# Patient Record
Sex: FEMALE | Race: OTHER | HISPANIC OR LATINO
[De-identification: names, ages, dates, MRNs, and addresses within clinical notes are randomized per-mention and may not be internally consistent; named-entity substitution may affect disease eponyms.]

---

## 2019-06-25 ENCOUNTER — APPOINTMENT (OUTPATIENT)
Dept: GASTROENTEROLOGY | Facility: CLINIC | Age: 44
End: 2019-06-25
Payer: MEDICAID

## 2019-06-25 VITALS
SYSTOLIC BLOOD PRESSURE: 140 MMHG | HEIGHT: 63 IN | TEMPERATURE: 98.5 F | WEIGHT: 161 LBS | BODY MASS INDEX: 28.53 KG/M2 | OXYGEN SATURATION: 100 % | HEART RATE: 81 BPM | DIASTOLIC BLOOD PRESSURE: 93 MMHG

## 2019-06-25 DIAGNOSIS — K30 FUNCTIONAL DYSPEPSIA: ICD-10-CM

## 2019-06-25 DIAGNOSIS — K64.9 UNSPECIFIED HEMORRHOIDS: ICD-10-CM

## 2019-06-25 DIAGNOSIS — I10 ESSENTIAL (PRIMARY) HYPERTENSION: ICD-10-CM

## 2019-06-25 DIAGNOSIS — K29.70 GASTRITIS, UNSPECIFIED, W/OUT BLEEDING: ICD-10-CM

## 2019-06-25 PROCEDURE — 99204 OFFICE O/P NEW MOD 45 MIN: CPT

## 2019-06-25 RX ORDER — PANTOPRAZOLE 40 MG/1
40 TABLET, DELAYED RELEASE ORAL DAILY
Qty: 30 | Refills: 2 | Status: ACTIVE | COMMUNITY
Start: 2019-06-25 | End: 1900-01-01

## 2019-06-29 PROBLEM — I10 HYPERTENSION, UNSPECIFIED TYPE: Status: ACTIVE | Noted: 2019-06-29

## 2019-06-29 RX ORDER — LOSARTAN POTASSIUM AND HYDROCHLOROTHIAZIDE 25; 100 MG/1; MG/1
100-25 TABLET ORAL
Refills: 0 | Status: ACTIVE | COMMUNITY
Start: 2019-06-29

## 2019-06-29 NOTE — REVIEW OF SYSTEMS
[Joint Pain] : joint pain [Eye Pain] : eye pain [Negative] : Heme/Lymph [Fever] : no fever [Chills] : no chills [Lower Ext Edema] : no lower extremity edema [Chest Pain] : no chest pain [Shortness Of Breath] : no shortness of breath [Wheezing] : no wheezing [Cough] : no cough [Abdominal Pain] : no abdominal pain [Diarrhea] : no diarrhea [Heartburn] : no heartburn [FreeTextEntry5] : HTN [FreeTextEntry7] : bloating , gas and indigestion and hemorrhoids  [FreeTextEntry9] : and back pain

## 2019-06-29 NOTE — PHYSICAL EXAM
[General Appearance - Alert] : alert [General Appearance - Well Nourished] : well nourished [General Appearance - Well-Appearing] : healthy appearing [General Appearance - Well Developed] : well developed [Sclera] : the sclera and conjunctiva were normal [Outer Ear] : the ears and nose were normal in appearance [Respiration, Rhythm And Depth] : normal respiratory rhythm and effort [Auscultation Breath Sounds / Voice Sounds] : lungs were clear to auscultation bilaterally [] : no respiratory distress [Heart Rate And Rhythm] : heart rate was normal and rhythm regular [Heart Sounds] : normal S1 and S2 [Edema] : there was no peripheral edema [Bowel Sounds] : normal bowel sounds [Abdomen Soft] : soft [Abnormal Walk] : normal gait [Oriented To Time, Place, And Person] : oriented to person, place, and time [Affect] : the affect was normal [Impaired Insight] : insight and judgment were intact [Cervical Lymph Nodes Enlarged Anterior Bilaterally] : anterior cervical [Supraclavicular Lymph Nodes Enlarged Bilaterally] : supraclavicular [No CVA Tenderness] : no ~M costovertebral angle tenderness [FreeTextEntry1] : No confusion

## 2019-06-29 NOTE — ASSESSMENT
[FreeTextEntry1] : Impression:\par \par #1.  Abdominal bloating\par #2.  Chronic dyspepsia x 3 years\par #3. Gastritis and hiatal hernia on EGD March 2018 (Dr. Lrary), with some improvement in symptoms on PPI.\par #4. Colonoscopy in March 2018 with poor prep / solid stool of cecum and hemorrhoids.\par \par Plan:\par \par #1.  Labs as below\par #2.  Ct scan abd/pelvis with contrast to look for inflammation, neoplasm.\par #3.  Gas-X PRN bloating\par #4.  Protonix daily\par #5.  Topical hemorrhoidal creme (prep H) PRN hemorrhoidal irritation.\par #6.  Colonoscopy.  Risks, benefits, alternatives discussed, patient understands and agrees to proceed.\par #7.  Golytely ordered.

## 2019-06-29 NOTE — REASON FOR VISIT
[Friend] : friend [Initial Evaluation] : an initial evaluation [FreeTextEntry1] : abdominal discomfort, bloating , diarrhea and hemorrhoidal discomfort

## 2019-06-29 NOTE — HISTORY OF PRESENT ILLNESS
[FreeTextEntry1] : Via [place]   # [id number]\par Accompanied by friend\par \par Patient is a 43 y.o Grenadian speaking  female who presents today with c/o abdominal discomfort/ bloating after meals, she reports the  symptoms are more frequent when she eats meals that contain meat. Moderate epigastric/periumbilical pain, sharp, lasting up to 30 minutes.  Denies nausea or vomiting, her weight is stable . She reports abdominal bloating and loose non bloody stools soon after eating a meal.  She gets some relief of symptoms after moving her bowels . She reports she has had these symptoms for over 3 years. \par Rare rectal irritation.\par No melena, hematochezia, hematemesis.\par No fever, chills, sweats, heartburn, dysphagia, jaundice.\par \par When in [place] she said she was diagnosed with gastritis and was on a PPI omeprazole that offered some relief. She has upper endoscopy dated 3/6/18 by Dr Rui Larry, [facility] noted for small hiatal hernia and gastritis , She had colonoscopy also 3/6/18 noted for Internal hemorrhoids, and the quality of the prep was reported as adequate, though the pictures that accompany the report there is visible stool. the cecum reported with feces

## 2019-07-01 LAB
ALBUMIN SERPL ELPH-MCNC: 5.1 G/DL
ALP BLD-CCNC: 59 U/L
ALT SERPL-CCNC: 65 U/L
AMYLASE/CREAT SERPL: 64 U/L
ANION GAP SERPL CALC-SCNC: 16 MMOL/L
AST SERPL-CCNC: 40 U/L
BASOPHILS # BLD AUTO: 0.02 K/UL
BASOPHILS NFR BLD AUTO: 0.5 %
BILIRUB SERPL-MCNC: 0.3 MG/DL
BUN SERPL-MCNC: 12 MG/DL
CALCIUM SERPL-MCNC: 10.3 MG/DL
CHLORIDE SERPL-SCNC: 97 MMOL/L
CO2 SERPL-SCNC: 26 MMOL/L
CREAT SERPL-MCNC: 0.59 MG/DL
EOSINOPHIL # BLD AUTO: 0.08 K/UL
EOSINOPHIL NFR BLD AUTO: 1.9 %
GLUCOSE SERPL-MCNC: 162 MG/DL
HCT VFR BLD CALC: 39.2 %
HGB BLD-MCNC: 12.5 G/DL
IMM GRANULOCYTES NFR BLD AUTO: 0.2 %
LPL SERPL-CCNC: 59 U/L
LYMPHOCYTES # BLD AUTO: 1.36 K/UL
LYMPHOCYTES NFR BLD AUTO: 32.6 %
MAN DIFF?: NORMAL
MCHC RBC-ENTMCNC: 27 PG
MCHC RBC-ENTMCNC: 31.9 GM/DL
MCV RBC AUTO: 84.7 FL
MONOCYTES # BLD AUTO: 0.33 K/UL
MONOCYTES NFR BLD AUTO: 7.9 %
NEUTROPHILS # BLD AUTO: 2.37 K/UL
NEUTROPHILS NFR BLD AUTO: 56.9 %
PLATELET # BLD AUTO: 206 K/UL
POTASSIUM SERPL-SCNC: 4.2 MMOL/L
PROT SERPL-MCNC: 7.6 G/DL
RBC # BLD: 4.63 M/UL
RBC # FLD: 12 %
SODIUM SERPL-SCNC: 139 MMOL/L
TSH SERPL-ACNC: 1.36 UIU/ML
TTG IGA SER IA-ACNC: <1.2 U/ML
TTG IGA SER-ACNC: NEGATIVE
WBC # FLD AUTO: 4.17 K/UL

## 2019-07-09 ENCOUNTER — OUTPATIENT (OUTPATIENT)
Dept: OUTPATIENT SERVICES | Facility: HOSPITAL | Age: 44
LOS: 1 days | End: 2019-07-09
Payer: MEDICAID

## 2019-07-09 ENCOUNTER — APPOINTMENT (OUTPATIENT)
Dept: ULTRASOUND IMAGING | Facility: CLINIC | Age: 44
End: 2019-07-09
Payer: MEDICAID

## 2019-07-09 DIAGNOSIS — Z00.8 ENCOUNTER FOR OTHER GENERAL EXAMINATION: ICD-10-CM

## 2019-07-09 DIAGNOSIS — R94.5 ABNORMAL RESULTS OF LIVER FUNCTION STUDIES: ICD-10-CM

## 2019-07-09 PROCEDURE — 76700 US EXAM ABDOM COMPLETE: CPT

## 2019-07-09 PROCEDURE — 76700 US EXAM ABDOM COMPLETE: CPT | Mod: 26

## 2019-07-23 ENCOUNTER — TRANSCRIPTION ENCOUNTER (OUTPATIENT)
Age: 44
End: 2019-07-23

## 2019-07-26 ENCOUNTER — APPOINTMENT (OUTPATIENT)
Dept: GASTROENTEROLOGY | Facility: HOSPITAL | Age: 44
End: 2019-07-26

## 2019-07-26 ENCOUNTER — OUTPATIENT (OUTPATIENT)
Dept: OUTPATIENT SERVICES | Facility: HOSPITAL | Age: 44
LOS: 1 days | End: 2019-07-26
Payer: MEDICAID

## 2019-07-26 ENCOUNTER — RESULT REVIEW (OUTPATIENT)
Age: 44
End: 2019-07-26

## 2019-07-26 DIAGNOSIS — K64.9 UNSPECIFIED HEMORRHOIDS: ICD-10-CM

## 2019-07-26 DIAGNOSIS — R19.7 DIARRHEA, UNSPECIFIED: ICD-10-CM

## 2019-07-26 DIAGNOSIS — R14.0 ABDOMINAL DISTENSION (GASEOUS): ICD-10-CM

## 2019-07-26 PROCEDURE — 45385 COLONOSCOPY W/LESION REMOVAL: CPT | Mod: GC

## 2019-07-26 PROCEDURE — 45380 COLONOSCOPY AND BIOPSY: CPT | Mod: XS

## 2019-07-26 PROCEDURE — 45380 COLONOSCOPY AND BIOPSY: CPT | Mod: GC,59

## 2019-07-26 PROCEDURE — 45385 COLONOSCOPY W/LESION REMOVAL: CPT

## 2019-07-26 PROCEDURE — 88305 TISSUE EXAM BY PATHOLOGIST: CPT | Mod: 26

## 2019-07-26 PROCEDURE — 88305 TISSUE EXAM BY PATHOLOGIST: CPT

## 2019-07-29 LAB — SURGICAL PATHOLOGY STUDY: SIGNIFICANT CHANGE UP

## 2019-08-21 ENCOUNTER — APPOINTMENT (OUTPATIENT)
Dept: HEPATOLOGY | Facility: CLINIC | Age: 44
End: 2019-08-21

## 2019-08-22 ENCOUNTER — APPOINTMENT (OUTPATIENT)
Dept: HEPATOLOGY | Facility: CLINIC | Age: 44
End: 2019-08-22
Payer: MEDICAID

## 2019-08-22 ENCOUNTER — LABORATORY RESULT (OUTPATIENT)
Age: 44
End: 2019-08-22

## 2019-08-22 VITALS
DIASTOLIC BLOOD PRESSURE: 79 MMHG | TEMPERATURE: 98.4 F | RESPIRATION RATE: 14 BRPM | HEIGHT: 63 IN | WEIGHT: 159 LBS | BODY MASS INDEX: 28.17 KG/M2 | HEART RATE: 86 BPM | SYSTOLIC BLOOD PRESSURE: 114 MMHG

## 2019-08-22 DIAGNOSIS — Z87.19 PERSONAL HISTORY OF OTHER DISEASES OF THE DIGESTIVE SYSTEM: ICD-10-CM

## 2019-08-22 PROCEDURE — 99203 OFFICE O/P NEW LOW 30 MIN: CPT

## 2019-08-23 LAB
A1AT SERPL-MCNC: 108 MG/DL
ALBUMIN SERPL ELPH-MCNC: 5 G/DL
ALP BLD-CCNC: 65 U/L
ALT SERPL-CCNC: 71 U/L
ANION GAP SERPL CALC-SCNC: 19 MMOL/L
AST SERPL-CCNC: 54 U/L
BASOPHILS # BLD AUTO: 0.03 K/UL
BASOPHILS NFR BLD AUTO: 0.5 %
BILIRUB SERPL-MCNC: 0.3 MG/DL
BUN SERPL-MCNC: 15 MG/DL
CALCIUM SERPL-MCNC: 10.3 MG/DL
CERULOPLASMIN SERPL-MCNC: 21 MG/DL
CHLORIDE SERPL-SCNC: 98 MMOL/L
CO2 SERPL-SCNC: 24 MMOL/L
CREAT SERPL-MCNC: 0.66 MG/DL
DEPRECATED KAPPA LC FREE/LAMBDA SER: 2.38 RATIO
EOSINOPHIL # BLD AUTO: 0.09 K/UL
EOSINOPHIL NFR BLD AUTO: 1.5 %
ESTIMATED AVERAGE GLUCOSE: 160 MG/DL
FERRITIN SERPL-MCNC: 113 NG/ML
GLUCOSE SERPL-MCNC: 112 MG/DL
HBA1C MFR BLD HPLC: 7.2 %
HBV CORE IGG+IGM SER QL: NONREACTIVE
HBV SURFACE AB SER QL: NONREACTIVE
HBV SURFACE AG SER QL: NONREACTIVE
HCT VFR BLD CALC: 40.7 %
HCV AB SER QL: NONREACTIVE
HCV S/CO RATIO: 0.06 S/CO
HGB BLD-MCNC: 12.4 G/DL
IGA SER QL IEP: 128 MG/DL
IGG SER QL IEP: 1024 MG/DL
IGM SER QL IEP: 47 MG/DL
IMM GRANULOCYTES NFR BLD AUTO: 0.2 %
IRON SATN MFR SERPL: 26 %
IRON SERPL-MCNC: 103 UG/DL
KAPPA LC CSF-MCNC: 0.69 MG/DL
KAPPA LC SERPL-MCNC: 1.64 MG/DL
LYMPHOCYTES # BLD AUTO: 1.83 K/UL
LYMPHOCYTES NFR BLD AUTO: 30 %
MAN DIFF?: NORMAL
MCHC RBC-ENTMCNC: 26.3 PG
MCHC RBC-ENTMCNC: 30.5 GM/DL
MCV RBC AUTO: 86.2 FL
MITOCHONDRIA AB SER IF-ACNC: NORMAL
MONOCYTES # BLD AUTO: 0.38 K/UL
MONOCYTES NFR BLD AUTO: 6.2 %
MPO AB + PR3 PNL SER: NORMAL
NEUTROPHILS # BLD AUTO: 3.76 K/UL
NEUTROPHILS NFR BLD AUTO: 61.6 %
PLATELET # BLD AUTO: 213 K/UL
POTASSIUM SERPL-SCNC: 3.7 MMOL/L
PROT SERPL-MCNC: 7.6 G/DL
RBC # BLD: 4.72 M/UL
RBC # FLD: 12.7 %
SMOOTH MUSCLE AB SER QL IF: NORMAL
SODIUM SERPL-SCNC: 140 MMOL/L
TIBC SERPL-MCNC: 404 UG/DL
UIBC SERPL-MCNC: 301 UG/DL
WBC # FLD AUTO: 6.1 K/UL

## 2019-08-26 LAB
ANA SER IF-ACNC: NEGATIVE
CARDIOLIPIN AB SER IA-ACNC: NEGATIVE
LKM AB SER QL IF: <20.1 UNITS
TTG IGA SER IA-ACNC: <1.2 U/ML
TTG IGA SER-ACNC: NEGATIVE

## 2019-08-30 LAB — SOLUBLE LIVER IGG SER IA-ACNC: < 20.1 UNITS

## 2019-09-20 ENCOUNTER — FORM ENCOUNTER (OUTPATIENT)
Age: 44
End: 2019-09-20

## 2019-09-21 ENCOUNTER — OUTPATIENT (OUTPATIENT)
Dept: OUTPATIENT SERVICES | Facility: HOSPITAL | Age: 44
LOS: 1 days | End: 2019-09-21
Payer: MEDICAID

## 2019-09-21 ENCOUNTER — APPOINTMENT (OUTPATIENT)
Dept: MRI IMAGING | Facility: IMAGING CENTER | Age: 44
End: 2019-09-21
Payer: MEDICAID

## 2019-09-21 DIAGNOSIS — K76.0 FATTY (CHANGE OF) LIVER, NOT ELSEWHERE CLASSIFIED: ICD-10-CM

## 2019-09-21 DIAGNOSIS — R94.5 ABNORMAL RESULTS OF LIVER FUNCTION STUDIES: ICD-10-CM

## 2019-09-21 PROCEDURE — 76391 MR ELASTOGRAPHY: CPT | Mod: 26

## 2019-09-21 PROCEDURE — 76391 MR ELASTOGRAPHY: CPT

## 2019-09-21 PROCEDURE — 74181 MRI ABDOMEN W/O CONTRAST: CPT | Mod: 26

## 2019-09-21 PROCEDURE — 74181 MRI ABDOMEN W/O CONTRAST: CPT

## 2019-09-26 ENCOUNTER — APPOINTMENT (OUTPATIENT)
Dept: GASTROENTEROLOGY | Facility: CLINIC | Age: 44
End: 2019-09-26
Payer: MEDICAID

## 2019-09-26 VITALS
HEART RATE: 88 BPM | OXYGEN SATURATION: 99 % | BODY MASS INDEX: 28 KG/M2 | RESPIRATION RATE: 14 BRPM | SYSTOLIC BLOOD PRESSURE: 112 MMHG | HEIGHT: 63 IN | WEIGHT: 158 LBS | DIASTOLIC BLOOD PRESSURE: 68 MMHG

## 2019-09-26 DIAGNOSIS — R12 HEARTBURN: ICD-10-CM

## 2019-09-26 DIAGNOSIS — R14.0 ABDOMINAL DISTENSION (GASEOUS): ICD-10-CM

## 2019-09-26 PROCEDURE — 99214 OFFICE O/P EST MOD 30 MIN: CPT

## 2019-10-07 PROBLEM — R12 HEARTBURN: Status: ACTIVE | Noted: 2019-10-07

## 2019-10-07 PROBLEM — R14.0 ABDOMINAL BLOATING: Status: ACTIVE | Noted: 2019-06-25

## 2019-10-07 NOTE — HISTORY OF PRESENT ILLNESS
[FreeTextEntry1] : Accompanied by family member, who translates Congolese per patient request.\par \par Still has mild-moderate right sided abd pain\par Associated with bloating/abd distention\par PPI helps heartburn but not pain.\par No melena, hematochezia, hematemesis.\par No fever, chills, sweats, dysphagia, jaundice.\par \par Labs: 6/25/19\par CBC essentially normal\par CMP notable for mild transaminitis (ALT 65)\par Amylase/lipase/transglutaminase IgA/TSH  normal.\par \par Imaging:\par \par She did not schedule CT\par \par Ultrasound 7/9/19:  Fatty liver with mild hepatomegaly, normal bile ducts, normal gallbladder.\par \par Had MRI abd 9/21/19: (WITHOUT CONTRAST)\par Severe hepatic steatosis\par Few scattered subcentimeter T2 bright foci, not fully characterized.\par \par Colonoscopy:complete to cecum with poor prep\par Removed 3 mm tubular adenoma with sigmoid\par Random biopsies without microscopic or collagenous colitis.

## 2019-10-07 NOTE — PHYSICAL EXAM
[General Appearance - Alert] : alert [General Appearance - In No Acute Distress] : in no acute distress [Oropharynx] : the oropharynx was normal [Sclera] : the sclera and conjunctiva were normal [Bowel Sounds] : normal bowel sounds [Abdomen Soft] : soft [] : no hepato-splenomegaly [Abdomen Mass (___ Cm)] : no abdominal mass palpated [RUQ] : in the right upper quadrant [Abnormal Walk] : normal gait [Affect] : the affect was normal [FreeTextEntry1] : No jaundice

## 2019-10-07 NOTE — CONSULT LETTER
[Dear  ___] : Dear  [unfilled], [Please see my note below.] : Please see my note below. [Consult Letter:] : I had the pleasure of evaluating your patient, [unfilled]. [Consult Closing:] : Thank you very much for allowing me to participate in the care of this patient.  If you have any questions, please do not hesitate to contact me. [FreeTextEntry3] : Didier Dumont MD, MPH, FASGE\par Chief of Clinical Quality in Gastroenterology, [facility]\par Director of Endoscopic Ultrasound, [facility]\par [address], [address]\par [place], [address]\par Weekdays 8 AM-4PM [phone] - [place]\par 24 hours [phone] [Sincerely,] : Sincerely,

## 2019-10-07 NOTE — ASSESSMENT
[FreeTextEntry1] : Impression:\par \par #1. RUQ abd pain/tenderness with abdominal bloating\par #2. Chronic dyspepsia x 3 years, no etiology on labs/ultrasound/MRI/EGD/colonoscopy (poor prep in cecum)\par #3. Gastritis and hiatal hernia on EGD March 2018 (Dr. Larry), with some improvement in symptoms (primarilly heartburn) on PPI.\par #4. Colonoscopy in March 2018 with poor prep / solid stool of cecum and hemorrhoids.  Repeat colonoscopy July 2019 with poor prep in cecum only.  Small 3 mm tubular adenoma removed from sigmoid colon..\par #5.  Fatty liver on ultrasound/MRI with minimal elevation in ALT.\par \par Plan:\par \par #1.  CT abd/pelvis with contrast to look for inflammation, neoplasm.\par #2.  Gastric emptying study\par #3.  Colonoscopy with modified prep\par \par Risks, benefits, alternatives of the procedure were discussed with the patient and the patient was educated about the procedure. Risks include, but are not limited to, bleeding, infection, injury to internal organs, possible need for further procedures including emergency surgery, missed lesions, risk of anesthesia, and risk of IV site problems.  Patient understands and agrees to proceed.\par \par #4.  GI office followup 3 month\par #5.  Renew PPI.\par #6.  Gas-X PRN bloating\par #7. Topical hemorrhoidal creme (prep H) PRN hemorrhoidal irritation.\par

## 2019-10-09 ENCOUNTER — FORM ENCOUNTER (OUTPATIENT)
Age: 44
End: 2019-10-09

## 2019-10-10 ENCOUNTER — OUTPATIENT (OUTPATIENT)
Dept: OUTPATIENT SERVICES | Facility: HOSPITAL | Age: 44
LOS: 1 days | End: 2019-10-10

## 2019-10-10 ENCOUNTER — APPOINTMENT (OUTPATIENT)
Dept: NUCLEAR MEDICINE | Facility: HOSPITAL | Age: 44
End: 2019-10-10
Payer: MEDICAID

## 2019-10-10 DIAGNOSIS — R14.0 ABDOMINAL DISTENSION (GASEOUS): ICD-10-CM

## 2019-10-10 PROCEDURE — 78264 GASTRIC EMPTYING IMG STUDY: CPT | Mod: 26

## 2019-10-16 ENCOUNTER — RX RENEWAL (OUTPATIENT)
Age: 44
End: 2019-10-16

## 2019-10-18 ENCOUNTER — RESULT REVIEW (OUTPATIENT)
Age: 44
End: 2019-10-18

## 2019-10-18 ENCOUNTER — OUTPATIENT (OUTPATIENT)
Dept: OUTPATIENT SERVICES | Facility: HOSPITAL | Age: 44
LOS: 1 days | End: 2019-10-18
Payer: MEDICAID

## 2019-10-18 ENCOUNTER — APPOINTMENT (OUTPATIENT)
Dept: GASTROENTEROLOGY | Facility: HOSPITAL | Age: 44
End: 2019-10-18

## 2019-10-18 DIAGNOSIS — R10.9 UNSPECIFIED ABDOMINAL PAIN: ICD-10-CM

## 2019-10-18 PROCEDURE — 88305 TISSUE EXAM BY PATHOLOGIST: CPT | Mod: 26

## 2019-10-18 PROCEDURE — 45380 COLONOSCOPY AND BIOPSY: CPT | Mod: GC,33

## 2019-10-18 PROCEDURE — 88305 TISSUE EXAM BY PATHOLOGIST: CPT

## 2019-10-18 PROCEDURE — 45380 COLONOSCOPY AND BIOPSY: CPT | Mod: PT

## 2019-10-21 LAB — SURGICAL PATHOLOGY STUDY: SIGNIFICANT CHANGE UP

## 2019-11-22 ENCOUNTER — APPOINTMENT (OUTPATIENT)
Dept: HEPATOLOGY | Facility: CLINIC | Age: 44
End: 2019-11-22
Payer: MEDICAID

## 2019-11-22 VITALS
SYSTOLIC BLOOD PRESSURE: 122 MMHG | HEIGHT: 63 IN | RESPIRATION RATE: 14 BRPM | HEART RATE: 88 BPM | DIASTOLIC BLOOD PRESSURE: 82 MMHG | BODY MASS INDEX: 27.46 KG/M2 | WEIGHT: 155 LBS | TEMPERATURE: 98.3 F

## 2019-11-22 PROCEDURE — 99213 OFFICE O/P EST LOW 20 MIN: CPT

## 2019-11-24 LAB
ALBUMIN SERPL ELPH-MCNC: 5 G/DL
ALP BLD-CCNC: 67 U/L
ALT SERPL-CCNC: 79 U/L
AST SERPL-CCNC: 53 U/L
BILIRUB DIRECT SERPL-MCNC: 0.1 MG/DL
BILIRUB INDIRECT SERPL-MCNC: 0.3 MG/DL
BILIRUB SERPL-MCNC: 0.4 MG/DL
PROT SERPL-MCNC: 7.5 G/DL

## 2019-11-27 LAB — HEPATITIS A IGG ANTIBODY: REACTIVE

## 2019-12-06 ENCOUNTER — APPOINTMENT (OUTPATIENT)
Dept: CT IMAGING | Facility: CLINIC | Age: 44
End: 2019-12-06
Payer: MEDICAID

## 2019-12-06 ENCOUNTER — OUTPATIENT (OUTPATIENT)
Dept: OUTPATIENT SERVICES | Facility: HOSPITAL | Age: 44
LOS: 1 days | End: 2019-12-06
Payer: MEDICAID

## 2019-12-06 DIAGNOSIS — R10.9 UNSPECIFIED ABDOMINAL PAIN: ICD-10-CM

## 2019-12-06 PROCEDURE — 74177 CT ABD & PELVIS W/CONTRAST: CPT | Mod: 26

## 2019-12-06 PROCEDURE — 74177 CT ABD & PELVIS W/CONTRAST: CPT

## 2020-01-09 ENCOUNTER — APPOINTMENT (OUTPATIENT)
Dept: GASTROENTEROLOGY | Facility: CLINIC | Age: 45
End: 2020-01-09
Payer: MEDICAID

## 2020-01-09 VITALS
DIASTOLIC BLOOD PRESSURE: 70 MMHG | OXYGEN SATURATION: 98 % | HEART RATE: 80 BPM | WEIGHT: 156 LBS | HEIGHT: 63 IN | SYSTOLIC BLOOD PRESSURE: 115 MMHG | BODY MASS INDEX: 27.64 KG/M2

## 2020-01-09 DIAGNOSIS — R10.811 RIGHT UPPER QUADRANT ABDOMINAL TENDERNESS: ICD-10-CM

## 2020-01-09 PROCEDURE — 99213 OFFICE O/P EST LOW 20 MIN: CPT

## 2020-01-12 PROBLEM — R10.811 RUQ ABDOMINAL TENDERNESS: Status: ACTIVE | Noted: 2020-01-12

## 2020-01-12 NOTE — PHYSICAL EXAM
[General Appearance - Alert] : alert [Sclera] : the sclera and conjunctiva were normal [General Appearance - In No Acute Distress] : in no acute distress [Abdomen Soft] : soft [Bowel Sounds] : normal bowel sounds [] : no hepato-splenomegaly [RUQ] : in the right upper quadrant [Abdomen Mass (___ Cm)] : no abdominal mass palpated [Abnormal Walk] : normal gait [Affect] : the affect was normal [FreeTextEntry1] : No confusion

## 2020-01-12 NOTE — HISTORY OF PRESENT ILLNESS
[FreeTextEntry1] : Patient follows up for right upper quadrant pain after eating/dyspepsia.\par Accompanied by a friend, who translates Japanese per patient request.\par \par She states that this has significantly improved, except for certain foods, rarely consumed.\par It is a burning-type quality.\par If she discontinues or holds her pantoprazole for 5 days, her symptoms worsened significantly.\par Her abdominal bloating has resolved.\par \par He has some right upper quadrant tenderness.\par \par MRI of the abdomen without contrast did not demonstrate any etiology for pain.\par Gastric emptying study was normal on 10/10/19\par \par Colonoscopy on 10/18/19 was complete to the terminal ileum with excellent preparation. A 2 mm tubular adenoma was resected. There were no other abnormalities except for internal hemorrhoids.\par \par Final Diagnosis\par Rectosigmoid, polyp, biopsy\par - Tubular adenoma\par \par

## 2020-01-12 NOTE — ASSESSMENT
[FreeTextEntry1] : Impression:\par \par #1.  Right upper quadrant abdominal pain/dyspepsia, improved by pantoprazole, likely nonulcer dyspepsia.\par \par #2.  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  2 mm tubular adenoma of colon, resected.\par \par Recommendations:\par \par #1. Continue pantoprazole 40 mg daily until current prescription finishes, then decrease to 20 mg daily.\par \par #2.  Office followup in 6 months.\par \par #3.  Surveillance colonoscopy in 5 years.\par If her symptoms worsen, we'll consider CT scan of the abdomen and pelvis with contrast.\par

## 2020-01-12 NOTE — CONSULT LETTER
[Please see my note below.] : Please see my note below. [Consult Letter:] : I had the pleasure of evaluating your patient, [unfilled]. [Dear  ___] : Dear  [unfilled], [Consult Closing:] : Thank you very much for allowing me to participate in the care of this patient.  If you have any questions, please do not hesitate to contact me. [Sincerely,] : Sincerely, [FreeTextEntry3] : Didier Dumont MD, MPH, FASGE\par Chief of Clinical Quality in Gastroenterology, [facility]\par Director of Endoscopic Ultrasound, [facility]\par [address], [address]\par [place], [address]\par Weekdays 8 AM-4PM [phone] - [place]\par 24 hours [phone]\par \par

## 2020-03-19 ENCOUNTER — APPOINTMENT (OUTPATIENT)
Dept: GASTROENTEROLOGY | Facility: CLINIC | Age: 45
End: 2020-03-19

## 2020-04-02 ENCOUNTER — APPOINTMENT (OUTPATIENT)
Dept: ENDOCRINOLOGY | Facility: CLINIC | Age: 45
End: 2020-04-02

## 2020-04-30 ENCOUNTER — APPOINTMENT (OUTPATIENT)
Dept: GASTROENTEROLOGY | Facility: CLINIC | Age: 45
End: 2020-04-30
Payer: MEDICAID

## 2020-04-30 DIAGNOSIS — R10.11 RIGHT UPPER QUADRANT PAIN: ICD-10-CM

## 2020-04-30 PROCEDURE — 99214 OFFICE O/P EST MOD 30 MIN: CPT | Mod: 95

## 2020-05-01 RX ORDER — POLYETHYLENE GLYCOL 3350 AND ELECTROLYTES WITH LEMON FLAVOR 236; 22.74; 6.74; 5.86; 2.97 G/4L; G/4L; G/4L; G/4L; G/4L
236 POWDER, FOR SOLUTION ORAL
Qty: 1 | Refills: 0 | Status: DISCONTINUED | COMMUNITY
Start: 2019-06-25 | End: 2020-05-01

## 2020-05-01 NOTE — HISTORY OF PRESENT ILLNESS
[Verbal consent obtained from patient] : the patient, [unfilled] [Time Spent: ___ minutes] : I have spent [unfilled] minutes with the patient on the telephone [FreeTextEntry1] : Telemedicine encounter was performed using dedicated computer program with audio and video communication.  Over 50% of encounter time was spent in face-to-face discussion regarding assessment problems and coordination of care.\par

## 2020-05-01 NOTE — PLAN
[FreeTextEntry1] : Impression:\par \par #1. Right upper quadrant abdominal pain/dyspepsia, improved by pantoprazole, likely nonulcer dyspepsia.\par \par #2. 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 2 mm tubular adenoma of colon, resected.\par \par Recommendations:\par \par #1. Continue pantoprazole 40 mg daily \par \par #2.  Benefiber\par \par #3. Office followup in 3 months.\par \par #4. Surveillance colonoscopy in 5 years.

## 2020-05-15 ENCOUNTER — APPOINTMENT (OUTPATIENT)
Dept: HEPATOLOGY | Facility: CLINIC | Age: 45
End: 2020-05-15

## 2020-05-22 ENCOUNTER — APPOINTMENT (OUTPATIENT)
Dept: HEPATOLOGY | Facility: CLINIC | Age: 45
End: 2020-05-22
Payer: MEDICAID

## 2020-05-22 DIAGNOSIS — R94.5 ABNORMAL RESULTS OF LIVER FUNCTION STUDIES: ICD-10-CM

## 2020-05-22 DIAGNOSIS — Z00.00 ENCOUNTER FOR GENERAL ADULT MEDICAL EXAMINATION W/OUT ABNORMAL FINDINGS: ICD-10-CM

## 2020-05-22 PROCEDURE — 99214 OFFICE O/P EST MOD 30 MIN: CPT | Mod: 95

## 2020-09-16 ENCOUNTER — NON-APPOINTMENT (OUTPATIENT)
Age: 45
End: 2020-09-16

## 2020-09-16 ENCOUNTER — APPOINTMENT (OUTPATIENT)
Dept: ENDOCRINOLOGY | Facility: CLINIC | Age: 45
End: 2020-09-16
Payer: MEDICAID

## 2020-09-16 VITALS
OXYGEN SATURATION: 98 % | HEART RATE: 90 BPM | DIASTOLIC BLOOD PRESSURE: 77 MMHG | SYSTOLIC BLOOD PRESSURE: 108 MMHG | WEIGHT: 160 LBS | HEIGHT: 65.35 IN | BODY MASS INDEX: 26.34 KG/M2

## 2020-09-16 LAB
GLUCOSE BLDC GLUCOMTR-MCNC: 123
HBA1C MFR BLD HPLC: 6.8

## 2020-09-16 PROCEDURE — 99205 OFFICE O/P NEW HI 60 MIN: CPT | Mod: 25

## 2020-09-16 PROCEDURE — 82962 GLUCOSE BLOOD TEST: CPT

## 2020-09-16 PROCEDURE — 83036 HEMOGLOBIN GLYCOSYLATED A1C: CPT | Mod: QW

## 2020-09-16 RX ORDER — METFORMIN HYDROCHLORIDE 500 MG/1
500 TABLET, COATED ORAL
Refills: 0 | Status: DISCONTINUED | COMMUNITY
Start: 2019-06-29 | End: 2020-09-16

## 2020-09-16 RX ORDER — METFORMIN ER 750 MG 750 MG/1
750 TABLET ORAL
Refills: 0 | Status: DISCONTINUED | COMMUNITY
End: 2020-09-16

## 2020-09-16 NOTE — REVIEW OF SYSTEMS
[Dysphagia] : dysphagia [Fatigue] : fatigue [Diarrhea] : diarrhea [Pain/Numbness of Digits] : pain/numbness of digits [Negative] : Heme/Lymph [All other systems negative] : All other systems negative

## 2020-09-16 NOTE — ASSESSMENT
[Diabetes Foot Care] : diabetes foot care [Long Term Vascular Complications] : long term vascular complications of diabetes [Carbohydrate Consistent Diet] : carbohydrate consistent diet [Importance of Diet and Exercise] : importance of diet and exercise to improve glycemic control, achieve weight loss and improve cardiovascular health [Exercise/Effect on Glucose] : exercise/effect on glucose [Self Monitoring of Blood Glucose] : self monitoring of blood glucose [Injection Technique, Storage, Sharps Disposal] : injection technique, storage, and sharps disposal [Retinopathy Screening] : Patient was referred to ophthalmology for retinopathy screening [Diabetic Medications] : Risks and benefits of diabetic medications were discussed [FreeTextEntry1] : Patient with well controlled type 2 diabetes. We did discuss that Metformin can cause GI issues. She will switch to extended release metformin. She observe to see if it makes a difference. I did advise her to continue with the GI w/u.  We discussed at length the importance of following a carbohydrate balanced diet and the importance of incorporating protein in meals. We also discussed appropriate alternative food choices. \par She will bring her meter to all visits. \par \par In terms of her thyroid, will have pt repeat thyroid sono in Nov. Will check TFTs. \par Will obtain and review labs from PCP from last week. If additional labs are needed I will let the pt know.\par f/u in 1 month.

## 2020-09-16 NOTE — CONSULT LETTER
[Dear  ___] : Dear  [unfilled], [Consult Letter:] : I had the pleasure of evaluating your patient, [unfilled]. [Please see my note below.] : Please see my note below. [Consult Closing:] : Thank you very much for allowing me to participate in the care of this patient.  If you have any questions, please do not hesitate to contact me. [Sincerely,] : Sincerely, [FreeTextEntry3] : Jessica PARRISHC

## 2020-09-16 NOTE — PAST MEDICAL HISTORY
[Surgical Menopause] : The patient is in surgical menopause [Menarche Age ____] : age at menarche was [unfilled] [Total Preg ___] : G[unfilled] [Regular Cycle Intervals] : have been regular [de-identified] : 2017

## 2020-09-16 NOTE — PHYSICAL EXAM
[Alert] : alert [Well Nourished] : well nourished [No Acute Distress] : no acute distress [Well Developed] : well developed [Normal Sclera/Conjunctiva] : normal sclera/conjunctiva [EOMI] : extra ocular movement intact [No Proptosis] : no proptosis [Normal Oropharynx] : the oropharynx was normal [Thyroid Not Enlarged] : the thyroid was not enlarged [No Thyroid Nodules] : no palpable thyroid nodules [No Respiratory Distress] : no respiratory distress [No Accessory Muscle Use] : no accessory muscle use [Clear to Auscultation] : lungs were clear to auscultation bilaterally [Normal S1, S2] : normal S1 and S2 [Normal Rate] : heart rate was normal [Regular Rhythm] : with a regular rhythm [No Edema] : no peripheral edema [Pedal Pulses Normal] : the pedal pulses are present [Normal Bowel Sounds] : normal bowel sounds [No Spinal Tenderness] : no spinal tenderness [Spine Straight] : spine straight [Normal Gait] : normal gait [Normal Strength/Tone] : muscle strength and tone were normal [No Rash] : no rash [Normal] : normal [Full ROM] : with full range of motion [Diminished Throughout Both Feet] : diminished tactile sensation with monofilament testing throughout both feet [Normal Reflexes] : deep tendon reflexes were 2+ and symmetric [No Tremors] : no tremors [Oriented x3] : oriented to person, place, and time [Acanthosis Nigricans] : no acanthosis nigricans [de-identified] : distended, tender to palpation throughout

## 2020-09-16 NOTE — REASON FOR VISIT
[DM Type 2] : DM Type 2 [Initial Evaluation] : an initial evaluation [Thyroid nodule/ MNG] : thyroid nodule/ MNG [Spouse] : spouse [Patient Declined  Services] : - None: Patient declined  services [Source: ______] : History obtained from [facility] [FreeTextEntry2] : Dr. Wakefield

## 2020-09-16 NOTE — HISTORY OF PRESENT ILLNESS
[FreeTextEntry1] : 45 year old female presents for evaluation of type 2 diabetes. She has been living with diabetes for over 10 years. She is presently on Metformin 750 mg BID. Her A1C today is 6.8, up from 6.5. She checks her BS several times per day, but did not bring in her meter. She reports her diet is fair. She does not exercise but is active during the day. She does c/o what sounds like diabetic neuropathy. She states she saw Optho 1 year ago and everything was stable. \par She has been having trouble swallowing and was sent for a thyroid sono, which showed multiple thyroid nodules. \par She does suffer from abodminal pain and is undergoing GI workup for this.

## 2020-10-08 ENCOUNTER — APPOINTMENT (OUTPATIENT)
Dept: GASTROENTEROLOGY | Facility: CLINIC | Age: 45
End: 2020-10-08

## 2020-10-16 ENCOUNTER — APPOINTMENT (OUTPATIENT)
Dept: ENDOCRINOLOGY | Facility: CLINIC | Age: 45
End: 2020-10-16
Payer: MEDICAID

## 2020-10-16 VITALS
SYSTOLIC BLOOD PRESSURE: 124 MMHG | BODY MASS INDEX: 26.34 KG/M2 | OXYGEN SATURATION: 97 % | HEIGHT: 65.35 IN | WEIGHT: 160 LBS | HEART RATE: 89 BPM | DIASTOLIC BLOOD PRESSURE: 84 MMHG

## 2020-10-16 DIAGNOSIS — Z23 ENCOUNTER FOR IMMUNIZATION: ICD-10-CM

## 2020-10-16 LAB — GLUCOSE BLDC GLUCOMTR-MCNC: 156

## 2020-10-16 PROCEDURE — 82962 GLUCOSE BLOOD TEST: CPT

## 2020-10-16 PROCEDURE — 99214 OFFICE O/P EST MOD 30 MIN: CPT | Mod: 25

## 2020-10-16 PROCEDURE — 36415 COLL VENOUS BLD VENIPUNCTURE: CPT

## 2020-10-16 PROCEDURE — G0008: CPT

## 2020-10-16 PROCEDURE — 90686 IIV4 VACC NO PRSV 0.5 ML IM: CPT

## 2020-10-16 NOTE — PHYSICAL EXAM
[Alert] : alert [Well Nourished] : well nourished [No Acute Distress] : no acute distress [Well Developed] : well developed [No Proptosis] : no proptosis [EOMI] : extra ocular movement intact [Normal Sclera/Conjunctiva] : normal sclera/conjunctiva [Normal Oropharynx] : the oropharynx was normal [Thyroid Not Enlarged] : the thyroid was not enlarged [No Thyroid Nodules] : no palpable thyroid nodules [No Respiratory Distress] : no respiratory distress [Normal S1, S2] : normal S1 and S2 [Clear to Auscultation] : lungs were clear to auscultation bilaterally [No Accessory Muscle Use] : no accessory muscle use [Normal Rate] : heart rate was normal [Pedal Pulses Normal] : the pedal pulses are present [No Edema] : no peripheral edema [Normal Bowel Sounds] : normal bowel sounds [Regular Rhythm] : with a regular rhythm [No Spinal Tenderness] : no spinal tenderness [Spine Straight] : spine straight [Normal Gait] : normal gait [No Rash] : no rash [Normal Strength/Tone] : muscle strength and tone were normal [Normal Reflexes] : deep tendon reflexes were 2+ and symmetric [Oriented x3] : oriented to person, place, and time [No Tremors] : no tremors [Acanthosis Nigricans] : no acanthosis nigricans [de-identified] : distended, tender to palpation throughout

## 2020-10-16 NOTE — ASSESSMENT
[Diabetes Foot Care] : diabetes foot care [Importance of Diet and Exercise] : importance of diet and exercise to improve glycemic control, achieve weight loss and improve cardiovascular health [Carbohydrate Consistent Diet] : carbohydrate consistent diet [Exercise/Effect on Glucose] : exercise/effect on glucose [FreeTextEntry1] : Patient with well controlled type 2 diabetes. Will increase Metformin to 1500 mg daily.  We discussed at length the importance of following a carbohydrate balanced diet and the importance of incorporating protein in meals. We also discussed appropriate alternative food choices. Handouts regarding diet given. We discussed ways she can incorporate exercise into her daily routine. Fasting labs done today. \par She will bring her meter to all visits. \par \par In terms of her thyroid, will have pt repeat thyroid sono. Labs done to check TFTs.\par \par Amylase and lipase included in labs due to GI pain.\par \par f/u in 2 months\par Will call with results.

## 2020-10-16 NOTE — HISTORY OF PRESENT ILLNESS
[FreeTextEntry1] : 45 year old female presents for follow up of type 2 diabetes. She has been living with diabetes for over 10 years. She is presently on Metformin 500 mg ER BID. Her A1C today was 6.8. She checks her BS several times per day, but did not bring in her meter. She reports her diet is fair. She does not exercise but is active during the day. She does c/o what sounds like diabetic neuropathy. She states she saw Optho 1 year ago and everything was stable. \par She has been having trouble swallowing and was sent for a thyroid sono, which showed multiple thyroid nodules. She did not go for the repeat thyroid sono.\par She does suffer from abdominal pain and is undergoing GI workup for this. She is very frustrated because she feels like she is being bounced around, but the pain is not improving. She does not feel the Metformin made a difference. Notes BS have been a bit higher recently.

## 2020-10-16 NOTE — REASON FOR VISIT
[Follow - Up] : a follow-up visit [DM Type 2] : DM Type 2 [Thyroid nodule/ MNG] : thyroid nodule/ MNG [Weight Management/Obesity] : weight management/obesity [Patient Declined  Services] : - None: Patient declined  services [Spouse] : spouse [Source: ______] : History obtained from [facility]

## 2020-10-23 LAB
25(OH)D3 SERPL-MCNC: 29.5 NG/ML
ALBUMIN SERPL ELPH-MCNC: 4.8 G/DL
ALP BLD-CCNC: 73 U/L
ALT SERPL-CCNC: 46 U/L
AMYLASE/CREAT SERPL: 76 U/L
ANION GAP SERPL CALC-SCNC: 16 MMOL/L
APPEARANCE: CLEAR
AST SERPL-CCNC: 29 U/L
BASOPHILS # BLD AUTO: 0.02 K/UL
BASOPHILS NFR BLD AUTO: 0.5 %
BILIRUB SERPL-MCNC: 0.4 MG/DL
BILIRUBIN URINE: NEGATIVE
BLOOD URINE: NEGATIVE
BUN SERPL-MCNC: 15 MG/DL
CALCIUM SERPL-MCNC: 10.1 MG/DL
CHLORIDE SERPL-SCNC: 98 MMOL/L
CHOLEST SERPL-MCNC: 252 MG/DL
CHOLEST/HDLC SERPL: 5.5 RATIO
CO2 SERPL-SCNC: 25 MMOL/L
COLOR: NORMAL
CREAT SERPL-MCNC: 0.47 MG/DL
CREAT SPEC-SCNC: 65 MG/DL
EOSINOPHIL # BLD AUTO: 0.08 K/UL
EOSINOPHIL NFR BLD AUTO: 1.8 %
FOLATE SERPL-MCNC: 17.9 NG/ML
GLUCOSE QUALITATIVE U: NEGATIVE
GLUCOSE SERPL-MCNC: 141 MG/DL
HCT VFR BLD CALC: 39.4 %
HDLC SERPL-MCNC: 46 MG/DL
HGB BLD-MCNC: 12.5 G/DL
IMM GRANULOCYTES NFR BLD AUTO: 0.2 %
KETONES URINE: NEGATIVE
LDLC SERPL CALC-MCNC: 163 MG/DL
LEUKOCYTE ESTERASE URINE: NEGATIVE
LPL SERPL-CCNC: 70 U/L
LYMPHOCYTES # BLD AUTO: 1.5 K/UL
LYMPHOCYTES NFR BLD AUTO: 33.9 %
MAN DIFF?: NORMAL
MCHC RBC-ENTMCNC: 26.5 PG
MCHC RBC-ENTMCNC: 31.7 GM/DL
MCV RBC AUTO: 83.7 FL
MICROALBUMIN 24H UR DL<=1MG/L-MCNC: <1.2 MG/DL
MICROALBUMIN/CREAT 24H UR-RTO: NORMAL MG/G
MONOCYTES # BLD AUTO: 0.3 K/UL
MONOCYTES NFR BLD AUTO: 6.8 %
NEUTROPHILS # BLD AUTO: 2.51 K/UL
NEUTROPHILS NFR BLD AUTO: 56.8 %
NITRITE URINE: NEGATIVE
PH URINE: 7
PLATELET # BLD AUTO: 227 K/UL
POTASSIUM SERPL-SCNC: 3.7 MMOL/L
PROT SERPL-MCNC: 7.2 G/DL
PROTEIN URINE: NEGATIVE
RBC # BLD: 4.71 M/UL
RBC # FLD: 12.4 %
SAVE SPECIMEN: NORMAL
SODIUM SERPL-SCNC: 139 MMOL/L
SPECIFIC GRAVITY URINE: 1.02
T4 FREE SERPL-MCNC: 1.4 NG/DL
T4 SERPL-MCNC: 8.7 UG/DL
THYROGLOB AB SERPL-ACNC: <20 IU/ML
THYROPEROXIDASE AB SERPL IA-ACNC: <10 IU/ML
TRIGL SERPL-MCNC: 215 MG/DL
TSH SERPL-ACNC: 0.84 UIU/ML
UROBILINOGEN URINE: NORMAL
VIT B12 SERPL-MCNC: 389 PG/ML
WBC # FLD AUTO: 4.42 K/UL

## 2020-12-18 ENCOUNTER — APPOINTMENT (OUTPATIENT)
Dept: ENDOCRINOLOGY | Facility: CLINIC | Age: 45
End: 2020-12-18
Payer: MEDICAID

## 2020-12-18 VITALS
TEMPERATURE: 96.8 F | WEIGHT: 162 LBS | DIASTOLIC BLOOD PRESSURE: 86 MMHG | HEART RATE: 84 BPM | OXYGEN SATURATION: 95 % | HEIGHT: 65.35 IN | BODY MASS INDEX: 26.67 KG/M2 | SYSTOLIC BLOOD PRESSURE: 137 MMHG

## 2020-12-18 DIAGNOSIS — Z20.828 CONTACT WITH AND (SUSPECTED) EXPOSURE TO OTHER VIRAL COMMUNICABLE DISEASES: ICD-10-CM

## 2020-12-18 LAB
GLUCOSE BLDC GLUCOMTR-MCNC: 133
HBA1C MFR BLD HPLC: 6.6

## 2020-12-18 PROCEDURE — 83036 HEMOGLOBIN GLYCOSYLATED A1C: CPT | Mod: QW

## 2020-12-18 PROCEDURE — 82962 GLUCOSE BLOOD TEST: CPT

## 2020-12-18 PROCEDURE — 36415 COLL VENOUS BLD VENIPUNCTURE: CPT

## 2020-12-18 PROCEDURE — 99213 OFFICE O/P EST LOW 20 MIN: CPT | Mod: 25

## 2020-12-18 PROCEDURE — 99072 ADDL SUPL MATRL&STAF TM PHE: CPT

## 2020-12-18 NOTE — HISTORY OF PRESENT ILLNESS
[FreeTextEntry1] : 45 year old female presents for follow up of type 2 diabetes. She has been living with diabetes for over 10 years. She is presently on Metformin 1500 mg ER daily. Her A1C today was 6.6, down from 6.8. She checks her BS several times per day, but did not bring in her meter. She reports her diet is fair. She does not exercise but is active during the day. She does c/o what sounds like diabetic neuropathy. She states she saw Optho 1 year ago and everything was stable. \par She has been having trouble swallowing and was sent for a thyroid sono, which showed multiple thyroid nodules. Thyroid sono in Dec 2020, shows stable nodules with 2 new subcm nodules. \par She does suffer from abdominal pain and is undergoing GI workup for this. She is very frustrated because she feels like she is being bounced around, but the pain is not improving. Has an appt with new GI in March.

## 2020-12-18 NOTE — PHYSICAL EXAM
[Alert] : alert [Well Nourished] : well nourished [No Acute Distress] : no acute distress [Well Developed] : well developed [Normal Sclera/Conjunctiva] : normal sclera/conjunctiva [EOMI] : extra ocular movement intact [No Proptosis] : no proptosis [Normal Oropharynx] : the oropharynx was normal [Thyroid Not Enlarged] : the thyroid was not enlarged [No Thyroid Nodules] : no palpable thyroid nodules [No Respiratory Distress] : no respiratory distress [No Accessory Muscle Use] : no accessory muscle use [Clear to Auscultation] : lungs were clear to auscultation bilaterally [Normal S1, S2] : normal S1 and S2 [Normal Rate] : heart rate was normal [Regular Rhythm] : with a regular rhythm [No Edema] : no peripheral edema [Pedal Pulses Normal] : the pedal pulses are present [Normal Bowel Sounds] : normal bowel sounds [No Spinal Tenderness] : no spinal tenderness [Spine Straight] : spine straight [Normal Gait] : normal gait [Normal Strength/Tone] : muscle strength and tone were normal [No Rash] : no rash [Normal Reflexes] : deep tendon reflexes were 2+ and symmetric [No Tremors] : no tremors [Oriented x3] : oriented to person, place, and time [Acanthosis Nigricans] : no acanthosis nigricans [de-identified] : distended, tender to palpation throughout

## 2020-12-18 NOTE — REASON FOR VISIT
[Follow - Up] : a follow-up visit [Spouse] : spouse [DM Type 2] : DM Type 2 [Thyroid nodule/ MNG] : thyroid nodule/ MNG

## 2020-12-18 NOTE — ASSESSMENT
[Diabetes Foot Care] : diabetes foot care [Long Term Vascular Complications] : long term vascular complications of diabetes [Carbohydrate Consistent Diet] : carbohydrate consistent diet [Importance of Diet and Exercise] : importance of diet and exercise to improve glycemic control, achieve weight loss and improve cardiovascular health [Exercise/Effect on Glucose] : exercise/effect on glucose [Self Monitoring of Blood Glucose] : self monitoring of blood glucose [Injection Technique, Storage, Sharps Disposal] : injection technique, storage, and sharps disposal [Retinopathy Screening] : Patient was referred to ophthalmology for retinopathy screening [Diabetic Medications] : Risks and benefits of diabetic medications were discussed [FreeTextEntry1] : Patient with well controlled type 2 diabetes. Continue Metformin to 1500 mg daily.  We discussed at length the importance of following a carbohydrate balanced diet and the importance of incorporating protein in meals. We also discussed appropriate alternative food choices. Handouts regarding diet given. We discussed ways she can incorporate exercise into her daily routine. \par Fasting labs done today. \par She will bring her meter to all visits. \par \par Labs done to check TFTs. Repeat thyroid sono in 6 months\par \par Recommended to call GI office and ask to expedite appts.\par \par f/u in 6 months\par Will call with results.

## 2020-12-23 LAB
25(OH)D3 SERPL-MCNC: 37.9 NG/ML
ALBUMIN SERPL ELPH-MCNC: 4.7 G/DL
ALP BLD-CCNC: 75 U/L
ALT SERPL-CCNC: 35 U/L
AMYLASE/CREAT SERPL: 81 U/L
ANION GAP SERPL CALC-SCNC: 16 MMOL/L
AST SERPL-CCNC: 24 U/L
BASOPHILS # BLD AUTO: 0.01 K/UL
BASOPHILS NFR BLD AUTO: 0.3 %
BILIRUB SERPL-MCNC: 0.3 MG/DL
BUN SERPL-MCNC: 14 MG/DL
CALCIUM SERPL-MCNC: 9.3 MG/DL
CHLORIDE SERPL-SCNC: 99 MMOL/L
CHOLEST SERPL-MCNC: 228 MG/DL
CO2 SERPL-SCNC: 24 MMOL/L
CREAT SERPL-MCNC: 0.55 MG/DL
EOSINOPHIL # BLD AUTO: 0.11 K/UL
EOSINOPHIL NFR BLD AUTO: 2.8 %
FOLATE SERPL-MCNC: 14.2 NG/ML
GLUCOSE SERPL-MCNC: 135 MG/DL
HCT VFR BLD CALC: 38.7 %
HDLC SERPL-MCNC: 44 MG/DL
HGB BLD-MCNC: 12.3 G/DL
IMM GRANULOCYTES NFR BLD AUTO: 0.5 %
LDLC SERPL CALC-MCNC: 145 MG/DL
LPL SERPL-CCNC: 81 U/L
LYMPHOCYTES # BLD AUTO: 1.41 K/UL
LYMPHOCYTES NFR BLD AUTO: 35.9 %
MAN DIFF?: NORMAL
MCHC RBC-ENTMCNC: 26.6 PG
MCHC RBC-ENTMCNC: 31.8 GM/DL
MCV RBC AUTO: 83.6 FL
MONOCYTES # BLD AUTO: 0.29 K/UL
MONOCYTES NFR BLD AUTO: 7.4 %
NEUTROPHILS # BLD AUTO: 2.09 K/UL
NEUTROPHILS NFR BLD AUTO: 53.1 %
NONHDLC SERPL-MCNC: 184 MG/DL
PLATELET # BLD AUTO: 224 K/UL
POTASSIUM SERPL-SCNC: 3.9 MMOL/L
PROT SERPL-MCNC: 7 G/DL
RBC # BLD: 4.63 M/UL
RBC # FLD: 13.1 %
SARS-COV-2 IGG SERPL IA-ACNC: 1.9 INDEX
SARS-COV-2 IGG SERPL QL IA: POSITIVE
SAVE SPECIMEN: NORMAL
SODIUM SERPL-SCNC: 139 MMOL/L
T4 FREE SERPL-MCNC: 1.2 NG/DL
T4 SERPL-MCNC: 7.2 UG/DL
TRIGL SERPL-MCNC: 192 MG/DL
TSH SERPL-ACNC: 0.82 UIU/ML
VIT B12 SERPL-MCNC: 320 PG/ML
WBC # FLD AUTO: 3.93 K/UL

## 2020-12-23 RX ORDER — ROSUVASTATIN CALCIUM 5 MG/1
5 TABLET, FILM COATED ORAL
Qty: 30 | Refills: 2 | Status: ACTIVE | COMMUNITY
Start: 2020-12-23 | End: 1900-01-01

## 2021-02-04 ENCOUNTER — RX RENEWAL (OUTPATIENT)
Age: 46
End: 2021-02-04

## 2021-04-27 ENCOUNTER — RX RENEWAL (OUTPATIENT)
Age: 46
End: 2021-04-27

## 2021-06-18 ENCOUNTER — APPOINTMENT (OUTPATIENT)
Dept: ENDOCRINOLOGY | Facility: CLINIC | Age: 46
End: 2021-06-18
Payer: MEDICAID

## 2021-06-18 VITALS
TEMPERATURE: 96.9 F | SYSTOLIC BLOOD PRESSURE: 104 MMHG | BODY MASS INDEX: 25.68 KG/M2 | HEART RATE: 72 BPM | HEIGHT: 65.35 IN | OXYGEN SATURATION: 97 % | DIASTOLIC BLOOD PRESSURE: 71 MMHG | WEIGHT: 156 LBS

## 2021-06-18 DIAGNOSIS — K76.0 FATTY (CHANGE OF) LIVER, NOT ELSEWHERE CLASSIFIED: ICD-10-CM

## 2021-06-18 DIAGNOSIS — E11.9 TYPE 2 DIABETES MELLITUS W/OUT COMPLICATIONS: ICD-10-CM

## 2021-06-18 DIAGNOSIS — E04.2 NONTOXIC MULTINODULAR GOITER: ICD-10-CM

## 2021-06-18 LAB
GLUCOSE BLDC GLUCOMTR-MCNC: 176
HBA1C MFR BLD HPLC: 7.2

## 2021-06-18 PROCEDURE — 83036 HEMOGLOBIN GLYCOSYLATED A1C: CPT | Mod: QW

## 2021-06-18 PROCEDURE — 82962 GLUCOSE BLOOD TEST: CPT

## 2021-06-18 PROCEDURE — 99214 OFFICE O/P EST MOD 30 MIN: CPT | Mod: 25

## 2021-06-20 PROBLEM — K76.0 FATTY LIVER DISEASE, NONALCOHOLIC: Status: ACTIVE | Noted: 2019-08-22

## 2021-06-20 PROBLEM — E04.2 MULTIPLE THYROID NODULES: Status: ACTIVE | Noted: 2020-09-16

## 2021-06-20 NOTE — PHYSICAL EXAM
[Alert] : alert [Well Nourished] : well nourished [No Acute Distress] : no acute distress [Well Developed] : well developed [Normal Sclera/Conjunctiva] : normal sclera/conjunctiva [EOMI] : extra ocular movement intact [No Proptosis] : no proptosis [Normal Oropharynx] : the oropharynx was normal [Thyroid Not Enlarged] : the thyroid was not enlarged [No Thyroid Nodules] : no palpable thyroid nodules [No Respiratory Distress] : no respiratory distress [No Accessory Muscle Use] : no accessory muscle use [Clear to Auscultation] : lungs were clear to auscultation bilaterally [Normal S1, S2] : normal S1 and S2 [Normal Rate] : heart rate was normal [Regular Rhythm] : with a regular rhythm [No Edema] : no peripheral edema [Normal Bowel Sounds] : normal bowel sounds [Pedal Pulses Normal] : the pedal pulses are present [No Spinal Tenderness] : no spinal tenderness [Spine Straight] : spine straight [Normal Gait] : normal gait [Normal Strength/Tone] : muscle strength and tone were normal [No Rash] : no rash [Normal Reflexes] : deep tendon reflexes were 2+ and symmetric [No Tremors] : no tremors [Oriented x3] : oriented to person, place, and time [Acanthosis Nigricans] : no acanthosis nigricans [de-identified] : distended, tender to palpation throughout

## 2021-06-20 NOTE — ASSESSMENT
[Diabetes Foot Care] : diabetes foot care [Long Term Vascular Complications] : long term vascular complications of diabetes [Carbohydrate Consistent Diet] : carbohydrate consistent diet [Importance of Diet and Exercise] : importance of diet and exercise to improve glycemic control, achieve weight loss and improve cardiovascular health [Exercise/Effect on Glucose] : exercise/effect on glucose [Self Monitoring of Blood Glucose] : self monitoring of blood glucose [Injection Technique, Storage, Sharps Disposal] : injection technique, storage, and sharps disposal [Retinopathy Screening] : Patient was referred to ophthalmology for retinopathy screening [Diabetic Medications] : Risks and benefits of diabetic medications were discussed [FreeTextEntry1] : Patient with well controlled type 2 diabetes. She will continue Steglujan. UA and labs done today to evaluate for causes of abd pain and burning with urination. Pending labs will call to make changes to the treatment plan. In the meantime, advised to follow low carb diet. SHe must drink more water. DIscussed mechanism of action of Steglujan. \par She will bring her meter to all visits. \par \par Repeat thyroid sono in 9/2021.\par \par Will request GI consult notes\par \par f/u in 6 weeks \par \par Will call with results.

## 2021-06-20 NOTE — HISTORY OF PRESENT ILLNESS
[FreeTextEntry1] : 45 year old female presents for follow up of type 2 diabetes. She has been living with diabetes for over 10 years. Her PCP stopped her Metformin due to a pain she was having in her RUQ radiating toward the back. PCP started the patient on Steglujan 5/100 mg daily for about 10 days. A few days after starting the new medication, she developed lower abd pain and some mild burning when she urinates. The pain in RUQ remains. She did see a new GI in March, and workup was done but no cause was identified. Her A1C is 7.2, up from 6.6. She checks her BS several times per day, but did not bring in her meter. She has seen BS in the 300s since switching medications. She reports her diet is fair. She does not exercise but is active during the day. She does c/o what sounds like diabetic neuropathy. She states she saw Optho 1 year ago and everything was stable. \par She has been having trouble swallowing and was sent for a thyroid sono, which showed multiple thyroid nodules. Thyroid sono in Dec 2020, shows stable nodules with 2 new subcm nodules. \par

## 2021-06-25 LAB
25(OH)D3 SERPL-MCNC: 36.9 NG/ML
ALBUMIN SERPL ELPH-MCNC: 4.9 G/DL
ALP BLD-CCNC: 80 U/L
ALT SERPL-CCNC: 80 U/L
AMYLASE/CREAT SERPL: 85 U/L
ANION GAP SERPL CALC-SCNC: 14 MMOL/L
APPEARANCE: CLEAR
AST SERPL-CCNC: 66 U/L
BASOPHILS # BLD AUTO: 0.02 K/UL
BASOPHILS NFR BLD AUTO: 0.5 %
BILIRUB SERPL-MCNC: 0.4 MG/DL
BILIRUBIN URINE: NEGATIVE
BLOOD URINE: NEGATIVE
BUN SERPL-MCNC: 17 MG/DL
CALCIUM SERPL-MCNC: 10.4 MG/DL
CHLORIDE SERPL-SCNC: 98 MMOL/L
CHOLEST SERPL-MCNC: 273 MG/DL
CO2 SERPL-SCNC: 26 MMOL/L
COLOR: NORMAL
CREAT SERPL-MCNC: 0.62 MG/DL
CREAT SPEC-SCNC: 68 MG/DL
EOSINOPHIL # BLD AUTO: 0.06 K/UL
EOSINOPHIL NFR BLD AUTO: 1.4 %
FOLATE SERPL-MCNC: 18.6 NG/ML
GLUCOSE QUALITATIVE U: ABNORMAL
GLUCOSE SERPL-MCNC: 161 MG/DL
HCT VFR BLD CALC: 38.9 %
HDLC SERPL-MCNC: 37 MG/DL
HGB BLD-MCNC: 12.6 G/DL
IMM GRANULOCYTES NFR BLD AUTO: 0.2 %
KETONES URINE: NEGATIVE
LDLC SERPL CALC-MCNC: 200 MG/DL
LEUKOCYTE ESTERASE URINE: NEGATIVE
LPL SERPL-CCNC: 93 U/L
LYMPHOCYTES # BLD AUTO: 1.23 K/UL
LYMPHOCYTES NFR BLD AUTO: 29.4 %
MAN DIFF?: NORMAL
MCHC RBC-ENTMCNC: 26.5 PG
MCHC RBC-ENTMCNC: 32.4 GM/DL
MCV RBC AUTO: 81.7 FL
MICROALBUMIN 24H UR DL<=1MG/L-MCNC: <1.2 MG/DL
MICROALBUMIN/CREAT 24H UR-RTO: NORMAL MG/G
MONOCYTES # BLD AUTO: 0.34 K/UL
MONOCYTES NFR BLD AUTO: 8.1 %
NEUTROPHILS # BLD AUTO: 2.53 K/UL
NEUTROPHILS NFR BLD AUTO: 60.4 %
NITRITE URINE: NEGATIVE
NONHDLC SERPL-MCNC: 236 MG/DL
PH URINE: 6.5
PLATELET # BLD AUTO: 254 K/UL
POTASSIUM SERPL-SCNC: 3.8 MMOL/L
PROT SERPL-MCNC: 7.6 G/DL
PROTEIN URINE: NEGATIVE
RBC # BLD: 4.76 M/UL
RBC # FLD: 13.3 %
SODIUM SERPL-SCNC: 138 MMOL/L
SPECIFIC GRAVITY URINE: 1.03
T4 FREE SERPL-MCNC: 1.3 NG/DL
T4 SERPL-MCNC: 8.1 UG/DL
TRIGL SERPL-MCNC: 178 MG/DL
TSH SERPL-ACNC: 0.73 UIU/ML
UROBILINOGEN URINE: NORMAL
VIT B12 SERPL-MCNC: 395 PG/ML
WBC # FLD AUTO: 4.19 K/UL

## 2021-09-13 ENCOUNTER — RX RENEWAL (OUTPATIENT)
Age: 46
End: 2021-09-13

## 2021-10-01 ENCOUNTER — APPOINTMENT (OUTPATIENT)
Dept: ENDOCRINOLOGY | Facility: CLINIC | Age: 46
End: 2021-10-01

## 2021-10-05 ENCOUNTER — RX RENEWAL (OUTPATIENT)
Age: 46
End: 2021-10-05

## 2021-10-06 ENCOUNTER — RX RENEWAL (OUTPATIENT)
Age: 46
End: 2021-10-06

## 2022-02-17 ENCOUNTER — APPOINTMENT (OUTPATIENT)
Dept: GASTROENTEROLOGY | Facility: CLINIC | Age: 47
End: 2022-02-17
Payer: MEDICAID

## 2022-02-17 VITALS
HEIGHT: 63 IN | RESPIRATION RATE: 14 BRPM | OXYGEN SATURATION: 98 % | SYSTOLIC BLOOD PRESSURE: 120 MMHG | BODY MASS INDEX: 28.7 KG/M2 | WEIGHT: 162 LBS | TEMPERATURE: 98 F | HEART RATE: 70 BPM | DIASTOLIC BLOOD PRESSURE: 75 MMHG

## 2022-02-17 DIAGNOSIS — R19.7 DIARRHEA, UNSPECIFIED: ICD-10-CM

## 2022-02-17 DIAGNOSIS — R10.9 UNSPECIFIED ABDOMINAL PAIN: ICD-10-CM

## 2022-02-17 DIAGNOSIS — D12.6 BENIGN NEOPLASM OF COLON, UNSPECIFIED: ICD-10-CM

## 2022-02-17 PROCEDURE — 99214 OFFICE O/P EST MOD 30 MIN: CPT

## 2022-02-17 RX ORDER — ERTUGLIFLOZIN AND SITAGLIPTIN 5; 100 MG/1; MG/1
5-100 TABLET, FILM COATED ORAL
Refills: 0 | Status: DISCONTINUED | COMMUNITY
End: 2022-02-17

## 2022-02-17 RX ORDER — METFORMIN ER 500 MG 500 MG/1
500 TABLET ORAL
Qty: 90 | Refills: 0 | Status: DISCONTINUED | COMMUNITY
Start: 2020-09-16 | End: 2022-02-17

## 2022-02-23 PROBLEM — R19.7 DIARRHEA: Status: ACTIVE | Noted: 2019-06-25

## 2022-02-23 PROBLEM — D12.6 COLON ADENOMA: Status: ACTIVE | Noted: 2020-01-12

## 2022-02-23 PROBLEM — R10.9 ABDOMINAL PAIN: Status: ACTIVE | Noted: 2019-06-25

## 2022-02-23 NOTE — HISTORY OF PRESENT ILLNESS
[FreeTextEntry1] : Patient follows up for abdominal discomfort.  Also reports altered bowel habits.  \par \par Sometimes constipation first / then abd pain / then diarrhea\par Every 2-3 days\par Associated with abd pain (nausea)\par No hematemesis, hematochezia, melena, vomiting.\par No fevers, chills, sweats.\par Weight stable, no jaundice\par \par Pantoprazole doesn't help, but still taking it.\par \par New medication: Trulicity (start 1.5 x 2-3 months0\par \par Avoids cheese  / milk / dairy\par \par Gas-X doesn't help\par IBgard helped\par Benefiber helped.\par ibuprofen does not help;.

## 2022-02-23 NOTE — PHYSICAL EXAM
[General Appearance - Alert] : alert [General Appearance - In No Acute Distress] : in no acute distress [Sclera] : the sclera and conjunctiva were normal [Oropharynx] : the oropharynx was normal [Auscultation Breath Sounds / Voice Sounds] : lungs were clear to auscultation bilaterally [Heart Rate And Rhythm] : heart rate was normal and rhythm regular [Bowel Sounds] : normal bowel sounds [Abdomen Soft] : soft [] : no hepato-splenomegaly [Abdomen Mass (___ Cm)] : no abdominal mass palpated [RUQ] : in the right upper quadrant [Cervical Lymph Nodes Enlarged Anterior Bilaterally] : anterior cervical [Supraclavicular Lymph Nodes Enlarged Bilaterally] : supraclavicular [No CVA Tenderness] : no ~M costovertebral angle tenderness [Abnormal Walk] : normal gait [Affect] : the affect was normal [FreeTextEntry1] : No confusion

## 2022-02-23 NOTE — CONSULT LETTER
[Dear  ___] : Dear  [unfilled], [Consult Letter:] : I had the pleasure of evaluating your patient, [unfilled]. [Please see my note below.] : Please see my note below. [Consult Closing:] : Thank you very much for allowing me to participate in the care of this patient.  If you have any questions, please do not hesitate to contact me. [Sincerely,] : Sincerely, [FreeTextEntry3] : Didier Dumont MD, MPH, BETINA, JULIUS\par Chief of Clinical Quality in Gastroenterology, [facility]\par Associate Chief of Gastroenterology, [facility]\par Interim Director of Endoscopy, [facility]\par Director of Endoscopic Ultrasound, [facility]\par [address], [address]\par [place], [address]\par 24 hours [phone]\par \par  [DrBrice  ___] : Dr. BURNETT

## 2022-02-23 NOTE — ASSESSMENT
[FreeTextEntry1] : Impression:\par \par #1. Right upper quadrant abdominal pain/dyspepsia, previously improved by pantoprazole but now is not, likely nonulcer dyspepsia.  Rule out peptic ulcer disease.\par \par #2. 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  Altered bowel habits, diarrhea greater than constipation \par \par #4.  2 mm tubular adenoma of colon, resected 2019\par \par Recommendations:\par \par #0.  Upper endoscopy to look for peptic ulcer disease.\par \par Risks, benefits, alternatives of the procedure were discussed with the patient and the patient was educated about the procedure. Risks include, but are not limited to, bleeding, infection, injury to internal organs, possible need for further procedures including emergency surgery, missed lesions, risk of anesthesia, and risk of IV site problems.  Patient understands and agrees to proceed.\par \par #1.  Discontinue pantoprazole as it does not appear to be helping her symptoms.\par \par #2.  Trial IBgard\par \par #3.  Benefiber supplementation\par \par #4.  If above patient treatment for irritable bowel syndrome/functional abdominal pain does not help, will consider nortriptyline\par \par #5.  Surveillance colonoscopy in the year 2024.

## 2022-04-05 ENCOUNTER — OUTPATIENT (OUTPATIENT)
Dept: OUTPATIENT SERVICES | Facility: HOSPITAL | Age: 47
LOS: 1 days | End: 2022-04-05
Payer: MEDICAID

## 2022-04-05 DIAGNOSIS — Z11.52 ENCOUNTER FOR SCREENING FOR COVID-19: ICD-10-CM

## 2022-04-05 LAB — SARS-COV-2 RNA SPEC QL NAA+PROBE: SIGNIFICANT CHANGE UP

## 2022-04-05 PROCEDURE — C9803: CPT

## 2022-04-05 PROCEDURE — U0003: CPT

## 2022-04-05 PROCEDURE — U0005: CPT

## 2022-04-07 NOTE — PRE PROCEDURE NOTE - PRE PROCEDURE EVALUATION
Attending Physician:   Dr. Didier Dumont                         Procedure: EGD    Indication for Procedure: abdominal discomfort  ________________________________________________________  PAST MEDICAL & SURGICAL HISTORY:    ALLERGIES:  Allergy Status Unknown    HOME MEDICATIONS:    AICD/PPM: [ ] yes   [ ] no    PERTINENT LAB DATA:                      PHYSICAL EXAMINATION:    T(C): --  HR: --  BP: --  RR: --  SpO2: --    Constitutional: NAD  HEENT: PERRLA, EOMI,    Neck:  No JVD  Respiratory: CTAB/L  Cardiovascular: S1 and S2  Gastrointestinal: BS+, soft, NT/ND  Extremities: No peripheral edema  Neurological: A/O x 3, no focal deficits  Psychiatric: Normal mood, normal affect  Skin: No rashes    ASA Class: I [ ]  II [ ]  III [ ]  IV [ ]    COMMENTS:    The patient is a suitable candidate for the planned procedure unless box checked [ ]  No, explain:

## 2022-04-08 ENCOUNTER — APPOINTMENT (OUTPATIENT)
Dept: GASTROENTEROLOGY | Facility: HOSPITAL | Age: 47
End: 2022-04-08

## 2022-04-08 ENCOUNTER — TRANSCRIPTION ENCOUNTER (OUTPATIENT)
Age: 47
End: 2022-04-08

## 2022-04-08 ENCOUNTER — RESULT REVIEW (OUTPATIENT)
Age: 47
End: 2022-04-08

## 2022-04-08 ENCOUNTER — OUTPATIENT (OUTPATIENT)
Dept: OUTPATIENT SERVICES | Facility: HOSPITAL | Age: 47
LOS: 1 days | End: 2022-04-08
Payer: MEDICAID

## 2022-04-08 VITALS
SYSTOLIC BLOOD PRESSURE: 113 MMHG | WEIGHT: 162.04 LBS | RESPIRATION RATE: 16 BRPM | HEART RATE: 82 BPM | OXYGEN SATURATION: 99 % | DIASTOLIC BLOOD PRESSURE: 75 MMHG | HEIGHT: 62 IN | TEMPERATURE: 98 F

## 2022-04-08 VITALS
DIASTOLIC BLOOD PRESSURE: 75 MMHG | HEART RATE: 84 BPM | RESPIRATION RATE: 16 BRPM | SYSTOLIC BLOOD PRESSURE: 113 MMHG | OXYGEN SATURATION: 98 %

## 2022-04-08 DIAGNOSIS — Z90.711 ACQUIRED ABSENCE OF UTERUS WITH REMAINING CERVICAL STUMP: Chronic | ICD-10-CM

## 2022-04-08 DIAGNOSIS — K30 FUNCTIONAL DYSPEPSIA: ICD-10-CM

## 2022-04-08 LAB — GLUCOSE BLDC GLUCOMTR-MCNC: 115 MG/DL — HIGH (ref 70–99)

## 2022-04-08 PROCEDURE — 82962 GLUCOSE BLOOD TEST: CPT

## 2022-04-08 PROCEDURE — 43239 EGD BIOPSY SINGLE/MULTIPLE: CPT | Mod: GC

## 2022-04-08 PROCEDURE — 43239 EGD BIOPSY SINGLE/MULTIPLE: CPT

## 2022-04-08 PROCEDURE — 88305 TISSUE EXAM BY PATHOLOGIST: CPT

## 2022-04-08 PROCEDURE — 88305 TISSUE EXAM BY PATHOLOGIST: CPT | Mod: 26

## 2022-04-08 NOTE — ASU DISCHARGE PLAN (ADULT/PEDIATRIC) - NS MD DC FALL RISK RISK
For information on Fall & Injury Prevention, visit: [email or web address] OR  [email or web address] OR  https://www.cdc.gov/steadi/patient.html

## 2022-04-08 NOTE — ASU PATIENT PROFILE, ADULT - FALL HARM RISK - UNIVERSAL INTERVENTIONS
Bed in lowest position, wheels locked, appropriate side rails in place/Call bell, personal items and telephone in reach/Instruct patient to call for assistance before getting out of bed or chair/Non-slip footwear when patient is out of bed/[place] to call system/Physically safe environment - no spills, clutter or unnecessary equipment/Purposeful Proactive Rounding/Room/bathroom lighting operational, light cord in reach

## 2022-04-13 LAB — SURGICAL PATHOLOGY STUDY: SIGNIFICANT CHANGE UP

## 2022-05-18 PROBLEM — I10 ESSENTIAL (PRIMARY) HYPERTENSION: Chronic | Status: ACTIVE | Noted: 2022-04-08

## 2022-05-18 PROBLEM — E11.9 TYPE 2 DIABETES MELLITUS WITHOUT COMPLICATIONS: Chronic | Status: ACTIVE | Noted: 2022-04-08

## 2022-05-26 ENCOUNTER — OUTPATIENT (OUTPATIENT)
Dept: OUTPATIENT SERVICES | Facility: HOSPITAL | Age: 47
LOS: 1 days | End: 2022-05-26
Payer: MEDICAID

## 2022-05-26 VITALS
OXYGEN SATURATION: 97 % | DIASTOLIC BLOOD PRESSURE: 77 MMHG | TEMPERATURE: 98 F | SYSTOLIC BLOOD PRESSURE: 112 MMHG | WEIGHT: 164.91 LBS | RESPIRATION RATE: 18 BRPM | HEART RATE: 86 BPM | HEIGHT: 63 IN

## 2022-05-26 DIAGNOSIS — E11.9 TYPE 2 DIABETES MELLITUS WITHOUT COMPLICATIONS: ICD-10-CM

## 2022-05-26 DIAGNOSIS — Z01.818 ENCOUNTER FOR OTHER PREPROCEDURAL EXAMINATION: ICD-10-CM

## 2022-05-26 DIAGNOSIS — N90.60 UNSPECIFIED HYPERTROPHY OF VULVA: ICD-10-CM

## 2022-05-26 DIAGNOSIS — I10 ESSENTIAL (PRIMARY) HYPERTENSION: ICD-10-CM

## 2022-05-26 DIAGNOSIS — Z90.711 ACQUIRED ABSENCE OF UTERUS WITH REMAINING CERVICAL STUMP: Chronic | ICD-10-CM

## 2022-05-26 LAB
A1C WITH ESTIMATED AVERAGE GLUCOSE RESULT: 8 % — HIGH (ref 4–5.6)
ANION GAP SERPL CALC-SCNC: 16 MMOL/L — SIGNIFICANT CHANGE UP (ref 5–17)
BUN SERPL-MCNC: 13 MG/DL — SIGNIFICANT CHANGE UP (ref 7–23)
CALCIUM SERPL-MCNC: 10.1 MG/DL — SIGNIFICANT CHANGE UP (ref 8.4–10.5)
CHLORIDE SERPL-SCNC: 99 MMOL/L — SIGNIFICANT CHANGE UP (ref 96–108)
CO2 SERPL-SCNC: 25 MMOL/L — SIGNIFICANT CHANGE UP (ref 22–31)
CREAT SERPL-MCNC: 0.54 MG/DL — SIGNIFICANT CHANGE UP (ref 0.5–1.3)
EGFR: 116 ML/MIN/1.73M2 — SIGNIFICANT CHANGE UP
ESTIMATED AVERAGE GLUCOSE: 183 MG/DL — HIGH (ref 68–114)
GLUCOSE SERPL-MCNC: 145 MG/DL — HIGH (ref 70–99)
HCT VFR BLD CALC: 39.4 % — SIGNIFICANT CHANGE UP (ref 34.5–45)
HGB BLD-MCNC: 12.8 G/DL — SIGNIFICANT CHANGE UP (ref 11.5–15.5)
MCHC RBC-ENTMCNC: 26.6 PG — LOW (ref 27–34)
MCHC RBC-ENTMCNC: 32.5 GM/DL — SIGNIFICANT CHANGE UP (ref 32–36)
MCV RBC AUTO: 81.9 FL — SIGNIFICANT CHANGE UP (ref 80–100)
NRBC # BLD: 0 /100 WBCS — SIGNIFICANT CHANGE UP (ref 0–0)
PLATELET # BLD AUTO: 220 K/UL — SIGNIFICANT CHANGE UP (ref 150–400)
POTASSIUM SERPL-MCNC: 3.6 MMOL/L — SIGNIFICANT CHANGE UP (ref 3.5–5.3)
POTASSIUM SERPL-SCNC: 3.6 MMOL/L — SIGNIFICANT CHANGE UP (ref 3.5–5.3)
RBC # BLD: 4.81 M/UL — SIGNIFICANT CHANGE UP (ref 3.8–5.2)
RBC # FLD: 12.8 % — SIGNIFICANT CHANGE UP (ref 10.3–14.5)
SODIUM SERPL-SCNC: 140 MMOL/L — SIGNIFICANT CHANGE UP (ref 135–145)
WBC # BLD: 3.51 K/UL — LOW (ref 3.8–10.5)
WBC # FLD AUTO: 3.51 K/UL — LOW (ref 3.8–10.5)

## 2022-05-26 PROCEDURE — 85027 COMPLETE CBC AUTOMATED: CPT

## 2022-05-26 PROCEDURE — 83036 HEMOGLOBIN GLYCOSYLATED A1C: CPT

## 2022-05-26 PROCEDURE — G0463: CPT

## 2022-05-26 PROCEDURE — 80048 BASIC METABOLIC PNL TOTAL CA: CPT

## 2022-05-26 RX ORDER — SODIUM CHLORIDE 9 MG/ML
1000 INJECTION, SOLUTION INTRAVENOUS
Refills: 0 | Status: DISCONTINUED | OUTPATIENT
Start: 2022-06-09 | End: 2022-06-23

## 2022-05-26 RX ORDER — SODIUM CHLORIDE 9 MG/ML
3 INJECTION INTRAMUSCULAR; INTRAVENOUS; SUBCUTANEOUS EVERY 8 HOURS
Refills: 0 | Status: DISCONTINUED | OUTPATIENT
Start: 2022-06-09 | End: 2022-06-23

## 2022-05-26 NOTE — H&P PST ADULT - HISTORY OF PRESENT ILLNESS
This is a 47 y/o female c/o hypertrophy of vulva, she presents today for vulvaplasty 6/9/22  covid swab to be done 6/6 at [facility]  pt denies recent travel or covid infections  pt speaks Lao, translated by Nhan  This is a 47 y/o female c/o hypertrophy of vulva, she presents today for vulvoplasty 6/9/22  covid swab to be done 6/6 at [facility]  pt denies recent travel or covid infections  pt speaks Bengali, translated by spouse Zbigniew Duarte and  Nhan  staff at [facility]

## 2022-05-26 NOTE — H&P PST ADULT - ASSESSMENT
sympotmatic excessive vaginal tissue , left  labia, posterior fourchette and skin tag on right side for removal , reviewed risk and benefits and post op care and warning signs

## 2022-06-06 ENCOUNTER — OUTPATIENT (OUTPATIENT)
Dept: OUTPATIENT SERVICES | Facility: HOSPITAL | Age: 47
LOS: 1 days | End: 2022-06-06
Payer: MEDICAID

## 2022-06-06 DIAGNOSIS — Z11.52 ENCOUNTER FOR SCREENING FOR COVID-19: ICD-10-CM

## 2022-06-06 DIAGNOSIS — Z90.711 ACQUIRED ABSENCE OF UTERUS WITH REMAINING CERVICAL STUMP: Chronic | ICD-10-CM

## 2022-06-06 LAB — SARS-COV-2 RNA SPEC QL NAA+PROBE: SIGNIFICANT CHANGE UP

## 2022-06-06 PROCEDURE — U0003: CPT

## 2022-06-06 PROCEDURE — U0005: CPT

## 2022-06-06 PROCEDURE — C9803: CPT

## 2022-06-08 ENCOUNTER — TRANSCRIPTION ENCOUNTER (OUTPATIENT)
Age: 47
End: 2022-06-08

## 2022-06-09 ENCOUNTER — TRANSCRIPTION ENCOUNTER (OUTPATIENT)
Age: 47
End: 2022-06-09

## 2022-06-09 ENCOUNTER — OUTPATIENT (OUTPATIENT)
Dept: OUTPATIENT SERVICES | Facility: HOSPITAL | Age: 47
LOS: 1 days | End: 2022-06-09
Payer: MEDICAID

## 2022-06-09 ENCOUNTER — RESULT REVIEW (OUTPATIENT)
Age: 47
End: 2022-06-09

## 2022-06-09 VITALS
RESPIRATION RATE: 16 BRPM | HEART RATE: 88 BPM | TEMPERATURE: 98 F | OXYGEN SATURATION: 97 % | DIASTOLIC BLOOD PRESSURE: 64 MMHG | SYSTOLIC BLOOD PRESSURE: 124 MMHG

## 2022-06-09 VITALS
DIASTOLIC BLOOD PRESSURE: 86 MMHG | HEIGHT: 63 IN | TEMPERATURE: 98 F | RESPIRATION RATE: 16 BRPM | SYSTOLIC BLOOD PRESSURE: 118 MMHG | HEART RATE: 90 BPM | OXYGEN SATURATION: 100 % | WEIGHT: 164.91 LBS

## 2022-06-09 DIAGNOSIS — N90.60 UNSPECIFIED HYPERTROPHY OF VULVA: ICD-10-CM

## 2022-06-09 DIAGNOSIS — Z90.711 ACQUIRED ABSENCE OF UTERUS WITH REMAINING CERVICAL STUMP: Chronic | ICD-10-CM

## 2022-06-09 LAB
GLUCOSE BLDC GLUCOMTR-MCNC: 155 MG/DL — HIGH (ref 70–99)
GLUCOSE BLDC GLUCOMTR-MCNC: 237 MG/DL — HIGH (ref 70–99)

## 2022-06-09 PROCEDURE — 82962 GLUCOSE BLOOD TEST: CPT

## 2022-06-09 PROCEDURE — 56800 PLASTIC REPAIR INTROITUS: CPT

## 2022-06-09 PROCEDURE — 88305 TISSUE EXAM BY PATHOLOGIST: CPT

## 2022-06-09 PROCEDURE — 88305 TISSUE EXAM BY PATHOLOGIST: CPT | Mod: 26

## 2022-06-09 RX ORDER — DULAGLUTIDE 4.5 MG/.5ML
0 INJECTION, SOLUTION SUBCUTANEOUS
Qty: 0 | Refills: 0 | DISCHARGE

## 2022-06-09 RX ORDER — ONDANSETRON 8 MG/1
4 TABLET, FILM COATED ORAL ONCE
Refills: 0 | Status: DISCONTINUED | OUTPATIENT
Start: 2022-06-09 | End: 2022-06-23

## 2022-06-09 RX ORDER — IBUPROFEN 200 MG
1 TABLET ORAL
Qty: 0 | Refills: 0 | DISCHARGE

## 2022-06-09 RX ORDER — SODIUM CHLORIDE 9 MG/ML
1000 INJECTION, SOLUTION INTRAVENOUS
Refills: 0 | Status: DISCONTINUED | OUTPATIENT
Start: 2022-06-09 | End: 2022-06-23

## 2022-06-09 RX ORDER — LIDOCAINE AND PRILOCAINE CREAM 25; 25 MG/G; MG/G
1 CREAM TOPICAL
Qty: 0 | Refills: 0 | DISCHARGE

## 2022-06-09 RX ORDER — LIDOCAINE HCL 20 MG/ML
0.2 VIAL (ML) INJECTION ONCE
Refills: 0 | Status: COMPLETED | OUTPATIENT
Start: 2022-06-09 | End: 2022-06-09

## 2022-06-09 RX ORDER — LOSARTAN POTASSIUM 100 MG/1
1 TABLET, FILM COATED ORAL
Qty: 0 | Refills: 0 | DISCHARGE

## 2022-06-09 RX ORDER — ACETAMINOPHEN 500 MG
3 TABLET ORAL
Qty: 0 | Refills: 0 | DISCHARGE

## 2022-06-09 RX ORDER — OXYCODONE HYDROCHLORIDE 5 MG/1
5 TABLET ORAL ONCE
Refills: 0 | Status: DISCONTINUED | OUTPATIENT
Start: 2022-06-09 | End: 2022-06-09

## 2022-06-09 RX ORDER — IBUPROFEN 200 MG
600 TABLET ORAL ONCE
Refills: 0 | Status: DISCONTINUED | OUTPATIENT
Start: 2022-06-09 | End: 2022-06-23

## 2022-06-09 RX ADMIN — SODIUM CHLORIDE 100 MILLILITER(S): 9 INJECTION, SOLUTION INTRAVENOUS at 07:28

## 2022-06-09 RX ADMIN — SODIUM CHLORIDE 3 MILLILITER(S): 9 INJECTION INTRAMUSCULAR; INTRAVENOUS; SUBCUTANEOUS at 07:26

## 2022-06-09 NOTE — ASU DISCHARGE PLAN (ADULT/PEDIATRIC) - NS MD DC FALL RISK RISK
For information on Fall & Injury Prevention, visit: [email or web address] OR  [email or web address] OR  https://www.cdc.gov/steadi/patient.html

## 2022-06-09 NOTE — ASU PATIENT PROFILE, ADULT - LANGUAGE ASSISTANCE NEEDED
No-Patient/Caregiver offered and refused free interpretation services. admitting nurse speaks Belarusian/No-Patient/Caregiver offered and refused free interpretation services.

## 2022-06-09 NOTE — ASU DISCHARGE PLAN (ADULT/PEDIATRIC) - CARE PROVIDER_API CALL
Erin Kothari  OBSTETRICS AND GYNECOLOGY  [address]  [place] [address]  Phone: [phone]  Fax: [phone]  Established Patient  Follow Up Time: 2 weeks

## 2022-06-09 NOTE — ASU DISCHARGE PLAN (ADULT/PEDIATRIC) - NURSING INSTRUCTIONS
Tylenol/acetaminophen------AND/OR------Motrin/ibuprofen  as needed for pain/discomfort.  NEXT DOSE:   Tylenol  OK @  2:45pm this afternoon, if needed.  Follow up with MD as requested; call office for appointment.

## 2022-06-09 NOTE — ASU DISCHARGE PLAN (ADULT/PEDIATRIC) - ASU DC SPECIAL INSTRUCTIONSFT
Postoperative Instructions      Pain control     For pain control, take the followin. Motrin 600mg four times a day, take with food  2. Add Tylenol 975 four times a day, alternated with Motrin.    Motrin and Tylenol can be obtained over the counter.  Emla cream has also been prescribed for postoperative pain and can be placed on the site of the sutures for pain control. Bacitracin may also be placed at the site of the sutures to prevent infection.     Incision Care  Keep your incision(s) clean and dry. It is ok to shower, but do not scrub the incision sites--just allow the water to gently wash over your skin. Your sutures are dissolvable.     Postoperative restrictions   Do not drive or make important decisions for 24 hours after anesthesia. You may feel drowsy for 24 hours and should have a responsible adult with you during that time. Nothing in the vagina (tampons, sexual intercourse), No tub baths, pools or hot tubs for 6 weeks (showers are ok!). No lifting anything heavier than 15 lbs, no strenuous exercise for 6 weeks after surgery. Do not pull or cut any stitches that you see around your incision.         Vaginal bleeding   Spotting and intermittent passage of blood clots per vagina is normal in first few weeks after surgery. If you are soaking 1 pad per hour, that is not normal and you should notify Dr. Kothari's office and seek medical attention right away.      Signs of Infection  Some postoperative pain and discomfort is normal. However, if you experience any of the following, you may be developing an infection and should be seen by your doctor: pain that does not get better with the oral medications listed above, fever greater than 100.4F, foul smelling discharge coming from the surgical site, nausea and vomiting that does not stop (especially if you are unable to tolerate oral intake), or inability to urinate. If you experience any of these symptoms, call your doctor's office to be seen right away.    Follow Up  Call Dr. Kothari's office to schedule a postoperative appointment in 2 weeks. The results from the procedure will be discussed with you at that time.

## 2022-06-09 NOTE — BRIEF OPERATIVE NOTE - OPERATION/FINDINGS
Excess skin located inferior to vaginal introitus, right labial skin tag and excess left labial skin clamped with hemostats and cut with curved morales scissors. Hemostasis achieved with bovie electrocautery along all removal sites. 2_0 chromic gut used to close inferior vaginal introitus skin removal site. 4_0 monocryl used to close left labial skin. Marcaine injected at all sites. Sponge count correct x2.

## 2022-06-09 NOTE — ASU PATIENT PROFILE, ADULT - FALL HARM RISK - UNIVERSAL INTERVENTIONS
Bed in lowest position, wheels locked, appropriate side rails in place/Call bell, personal items and telephone in reach/Instruct patient to call for assistance before getting out of bed or chair/Non-slip footwear when patient is out of bed/[place] to call system/Physically safe environment - no spills, clutter or unnecessary equipment/Purposeful Proactive Rounding/Room/bathroom lighting operational, light cord in reach

## 2022-06-09 NOTE — ASU DISCHARGE PLAN (ADULT/PEDIATRIC) - MEDICATION INSTRUCTIONS
Emla has been sent to your pharmacy for use for pain control around your incision Tylenol and Ibuprofen as needed for postoperative pain

## 2022-06-20 LAB — SURGICAL PATHOLOGY STUDY: SIGNIFICANT CHANGE UP

## 2022-10-17 NOTE — ASU DISCHARGE PLAN (ADULT/PEDIATRIC) - NSDISCH_BIOPSY_ENDO_ALL_CORE_FT
[No Acute Distress] : no acute distress [Well Nourished] : well nourished [Well Developed] : well developed [Well-Appearing] : well-appearing If you had a biopsy, you should not take aspirin or aspirin like products for the next 10 days unless instructed to do so by your doctor. If you had a biopsy, check with your doctor before taking any blood thinners such as warfarin (Coumadin). [EOMI] : extraocular movements intact [Normal Outer Ear/Nose] : the outer ears and nose were normal in appearance [No JVD] : no jugular venous distention [No Respiratory Distress] : no respiratory distress  [Normal Rate] : normal rate  [Regular Rhythm] : with a regular rhythm [Normal S1, S2] : normal S1 and S2 [No Carotid Bruits] : no carotid bruits [No Edema] : there was no peripheral edema [No Palpable Aorta] : no palpable aorta [No Extremity Clubbing/Cyanosis] : no extremity clubbing/cyanosis [Soft] : abdomen soft [Non Tender] : non-tender [Non-distended] : non-distended [No Masses] : no abdominal mass palpated [No HSM] : no HSM [Normal Bowel Sounds] : normal bowel sounds [No CVA Tenderness] : no CVA  tenderness [Grossly Normal Strength/Tone] : grossly normal strength/tone [No Rash] : no rash [Coordination Grossly Intact] : coordination grossly intact [No Focal Deficits] : no focal deficits [Normal Gait] : normal gait [Normal Affect] : the affect was normal [Normal Mood] : the mood was normal [Normal Insight/Judgement] : insight and judgment were intact

## 2024-06-07 ENCOUNTER — APPOINTMENT (OUTPATIENT)
Dept: ORTHOPEDIC SURGERY | Facility: CLINIC | Age: 49
End: 2024-06-07
Payer: MEDICAID

## 2024-06-07 VITALS — WEIGHT: 158 LBS | HEIGHT: 63 IN | BODY MASS INDEX: 28 KG/M2

## 2024-06-07 DIAGNOSIS — M47.26 OTHER SPONDYLOSIS WITH RADICULOPATHY, LUMBAR REGION: ICD-10-CM

## 2024-06-07 PROCEDURE — 99204 OFFICE O/P NEW MOD 45 MIN: CPT

## 2024-06-07 PROCEDURE — 72170 X-RAY EXAM OF PELVIS: CPT

## 2024-06-07 PROCEDURE — 72100 X-RAY EXAM L-S SPINE 2/3 VWS: CPT

## 2024-06-07 RX ORDER — CYCLOBENZAPRINE HYDROCHLORIDE 10 MG/1
10 TABLET, FILM COATED ORAL
Refills: 0 | Status: ACTIVE | COMMUNITY

## 2024-06-07 RX ORDER — NABUMETONE 750 MG/1
750 TABLET, FILM COATED ORAL
Refills: 0 | Status: ACTIVE | COMMUNITY

## 2024-06-07 RX ORDER — METHYLPREDNISOLONE 4 MG/1
4 TABLET ORAL
Qty: 1 | Refills: 0 | Status: ACTIVE | COMMUNITY
Start: 2024-06-07 | End: 1900-01-01

## 2024-06-07 NOTE — IMAGING
[Disc space narrowing] : Disc space narrowing [AP] : anteroposterior [Mild arthritis (Tonnis Grade 1)] : Mild arthritis (Tonnis Grade 1) [de-identified] :   ----------------------------------------------------------------------------   Thoracic/Lumbar spine exam:   Inspection:    (neg) Abnormal alignment (kyphosis/lordosis/scoliosis)   (neg) Atrophy ROM:    Pain:           (+) Flexion/extension     (+) Rotation    Stiffness:   (+) Flexion/extension     (+) Rotation                       (+) Hamstring tightness Tenderness/Spasm:             Lumbar paraspinal:          (+) Right    (neg) Left    (+) Midline    Thoracic paraspinal:        (neg) Right    (neg) Left    (neg) Midline    PSIS:                                (+) Right    (neg) Left    SI joint:                             (+) Right    (neg) Left    Greater troch:                  (neg) Right    (neg) Left Strength: (out of 5)    Illiopsoas:           Right: 5   .    Left: 5    Quad:                 Right: 5   .    Left: 5    Hamstrings:        Right: 5   .    Left: 5    Anterior tibialis:  Right: 5    .   Left: 5    Gastrocsoleus:  Right: 5    .   Left: 5    EHL:                    Right: 5    .   Left: 5 Neuro: DTR's wnl.  Sensation to light touch grossly in tact in all distributions.    (+) SLR    (neg) Femoral stretch Vascularity: Extremity warm and well perfused     ----------------------------------------------------------------------------   Right hip exam:   Inspection: no deformity, no swelling, no gross limb length discrepancy ROM:    Flexion: 100    ER: 50    IR: 20 Tenderness:    (neg) Groin tenderness    (neg) Greater trochanteric tenderness    (neg) Buttock tenderness    (neg) IT Band tenderness    (neg) Anterior thigh tenderness    (neg)ASIS tenderness    (neg) Ischial tuberosity    (neg) Hamstring muscle tenderness     (+) gluteal tenderness Additional tests:    (+) minimal FADIR    (neg) JULIETTE    (neg) Resisted hip flexion pain    (neg) Apprehension (external rotation/extension)    (neg) Posterior pain with forced hip flexion and knee extension    (neg) Tight hamstrings    (neg) Log roll    (neg) Axial load Strength: 5/5 IP/Q/H/TA/GS/EHL Neuro: In tact to light touch throughout, DTR's wnl Vascularity: Extremity warm and well perfused Gait: antalgic       [Straightening consistent with spasm] : Straightening consistent with spasm [FreeTextEntry1] : L5-S1 DDD [de-identified] : over coverage of the hips,  labral calcification

## 2024-06-07 NOTE — HISTORY OF PRESENT ILLNESS
[de-identified] : Pt i a 49 year old female with lower back pain with radiates down the R side all the way down to her toes with no injury x 3 months. Meenu to [facility] and had xrays and dispensed lidocaine patches and cyclobenzaprine and Ibu 500, nambutone with min relief.  Tried PT x 4 wks, but PT made her pain worse. no b/b changes. Pt had MDP 2 weeks ago with good relief   h/o of diabtes- Last A1c 6.5

## 2024-06-07 NOTE — DISCUSSION/SUMMARY
[Medication Risks Reviewed] : Medication risks reviewed [de-identified] : Given persistent pain despite oral steroid prescription, NSAIDs, and 4 weeks of PT with persistent nerve tension signs rec MRI eval HNP for possible epidural injection RX:MDP Rec f/u with pain management  ----------------------------------------------------------------------------  Patient was advised that steroid medications may result in increased blood sugars, and given patient's history of diabetes, blood sugars should be monitored closely, where applicable, for 5-10 days during and following steroid medication intake orally or through injection.   ----------------------------------------------------------------------------   All relevant imaging studies pertinent to today's visit, including x-rays, MRI's and/or other advanced imaging studies (CT/etc) were independently interpreted and reviewed with the patient as needed. Implications of the studies together with the patient's clinical picture were discussed to formulate a working diagnosis and management options were detailed.   The patient and/or guardian was advised of the diagnosis.  The natural history of the pathology was explained in full. All questions were answered.  The risks and benefits of conservative and interventional treatment alternatives were explained to the patient  The patient and/or guardian was advised if any advanced diagnostic/imaging study (MRI/CT/etc) is ordered to evaluate potential pathology in the affected area(s), they should follow up in the office to review the results of the study and determine further management that may be indicated.

## 2024-06-26 ENCOUNTER — APPOINTMENT (OUTPATIENT)
Dept: MRI IMAGING | Facility: CLINIC | Age: 49
End: 2024-06-26

## 2024-06-28 ENCOUNTER — APPOINTMENT (OUTPATIENT)
Dept: PAIN MANAGEMENT | Facility: CLINIC | Age: 49
End: 2024-06-28

## 2024-06-28 VITALS — BODY MASS INDEX: 28.17 KG/M2 | HEIGHT: 63 IN | WEIGHT: 159 LBS

## 2024-06-28 DIAGNOSIS — M54.50 LOW BACK PAIN, UNSPECIFIED: ICD-10-CM

## 2024-06-28 DIAGNOSIS — M54.17 RADICULOPATHY, LUMBOSACRAL REGION: ICD-10-CM

## 2024-06-28 PROCEDURE — 99203 OFFICE O/P NEW LOW 30 MIN: CPT | Mod: 25

## 2024-06-28 PROCEDURE — J3490M: CUSTOM

## 2024-06-28 PROCEDURE — 20552 NJX 1/MLT TRIGGER POINT 1/2: CPT

## 2024-07-09 ENCOUNTER — APPOINTMENT (OUTPATIENT)
Dept: MRI IMAGING | Facility: CLINIC | Age: 49
End: 2024-07-09

## 2024-07-09 ENCOUNTER — APPOINTMENT (OUTPATIENT)
Dept: MRI IMAGING | Facility: CLINIC | Age: 49
End: 2024-07-09
Payer: MEDICAID

## 2024-07-09 PROCEDURE — 72148 MRI LUMBAR SPINE W/O DYE: CPT

## 2024-07-16 ENCOUNTER — APPOINTMENT (OUTPATIENT)
Dept: PAIN MANAGEMENT | Facility: CLINIC | Age: 49
End: 2024-07-16
Payer: MEDICAID

## 2024-07-16 VITALS — BODY MASS INDEX: 28.17 KG/M2 | WEIGHT: 159 LBS | HEIGHT: 63 IN

## 2024-07-16 DIAGNOSIS — M54.50 LOW BACK PAIN, UNSPECIFIED: ICD-10-CM

## 2024-07-16 DIAGNOSIS — M54.17 RADICULOPATHY, LUMBOSACRAL REGION: ICD-10-CM

## 2024-07-16 PROCEDURE — 99214 OFFICE O/P EST MOD 30 MIN: CPT

## 2024-08-12 ENCOUNTER — APPOINTMENT (OUTPATIENT)
Dept: PAIN MANAGEMENT | Facility: CLINIC | Age: 49
End: 2024-08-12
Payer: MEDICAID

## 2024-08-12 DIAGNOSIS — M54.17 RADICULOPATHY, LUMBOSACRAL REGION: ICD-10-CM

## 2024-08-12 PROCEDURE — 62323 NJX INTERLAMINAR LMBR/SAC: CPT

## 2024-08-12 NOTE — PROCEDURE
[FreeTextEntry3] : Date of Service: 08/12/2024   Account: [id number]  Patient: CORONA MANNING   YOB: 1975  Age: 49 year   Surgeon:                                                         Blaise Haq D.O.  Pre-Operative Diagnosis:                             Lumbosacral radiculitis  Post-Operative Diagnosis:                           Same  Procedure:                                                      Interlaminar lumbar epidural steroid injection (L5-S1) under fluoroscopic guidance  Anesthesia:                                                     Local with MAC   This procedure was carried out using fluoroscopic guidance.  The risks and benefits of the procedure were discussed extensively with the patient.  The consent of the patient was obtained and the following procedure was performed.  The patient was placed in the prone position.  The lumbar area was prepped and draped in a sterile fashion.  A timeout was performed with all essential staff present and the site and side were verified. Under AP view with slight cephalad-caudad angulation, the L5-S1 interspace was identified and marked.  Using sterile technique, the superficial skin was anesthetized with 1% Lidocaine without epinephrine.  A 20-gauge Tuohy needle was advanced into the epidural space under fluoroscopy using bijys-[id number] technique and using loss of resistance at the L5-S1 level.  After negative aspiration for heme or CSF, an epidurogram was obtained using 2-3 cc Omnipaque contrast injected under live fluoroscopy, confirming epidural placement of the needle.    Epidurogram showed no evidence of intrathecal or intravascular flow, and good evidence of bilateral epidural flow from L3-S2 levels.  After this, 4 cc of preservative free normal saline plus 12 mg of betamethasone were injected into the epidural space.  The needle was subsequently removed.  Anesthesia personnel were present throughout the procedure.  The patient tolerated the procedure well and was instructed to contact me immediately if there were any problems.  Blaise Haq D.O.

## 2024-09-06 ENCOUNTER — APPOINTMENT (OUTPATIENT)
Dept: PAIN MANAGEMENT | Facility: CLINIC | Age: 49
End: 2024-09-06
Payer: MEDICAID

## 2024-09-06 VITALS — WEIGHT: 159 LBS | BODY MASS INDEX: 28.17 KG/M2 | HEIGHT: 63 IN

## 2024-09-06 DIAGNOSIS — M54.17 RADICULOPATHY, LUMBOSACRAL REGION: ICD-10-CM

## 2024-09-06 DIAGNOSIS — M54.50 LOW BACK PAIN, UNSPECIFIED: ICD-10-CM

## 2024-09-06 PROCEDURE — 99214 OFFICE O/P EST MOD 30 MIN: CPT

## 2024-09-06 NOTE — PHYSICAL EXAM
[de-identified] : Constitutional; Appears well, no apparent distress Ability to communicate: Normal  Respiratory: non-labored breathing Skin: No rash noted Head: Normocephalic, atraumatic Neck: no visible thyroid enlargement Eyes: Extraocular movements intact Neurologic: Alert and oriented x3 Psychiatric: normal mood, affect and behavior [] : non-antalgic

## 2024-09-06 NOTE — DISCUSSION/SUMMARY
[de-identified] : After discussing various treatment options with the patient including but not limited to oral medications, physical therapy, exercise modalities as well as interventional spinal injections, we have decided with the following plan:  - Continue Home exercises, stretching, activity modification, physical therapy, and conservative care. - MRI report and/or images was reviewed and discussed with the patient. - Recommend L5-S1 Lumbar Epidural Steroid Injection under fluoroscopic guidance with image. (RIGHT)  - The risks, benefits and alternatives of the proposed procedure were explained in detail with the patient. The risks outlined include but are not limited to infection, bleeding, post-dural puncture headache, nerve injury, a temporary increase in pain, failure to resolve symptoms, allergic reaction, symptom recurrence, and possible elevation of blood sugar in diabetics. All questions were answered to patient's apparent satisfaction and he/she verbalized an understanding. - Patient is presenting with acute/sub-acute radicular pain with impairment in ADLs and functionality.  The pain has not responded to conservative care including NSAID therapy and/or physical therapy.  There is no bleeding tendency, unstable medical condition, or systemic infection. - Follow up in 1-2 weeks post injection for re-evaluation. - Will provide prescription for Physical Therapy.
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
## 2024-11-18 ENCOUNTER — APPOINTMENT (OUTPATIENT)
Dept: PAIN MANAGEMENT | Facility: CLINIC | Age: 49
End: 2024-11-18
Payer: MEDICAID

## 2024-11-18 DIAGNOSIS — M54.17 RADICULOPATHY, LUMBOSACRAL REGION: ICD-10-CM

## 2024-11-18 PROCEDURE — 62323 NJX INTERLAMINAR LMBR/SAC: CPT

## 2024-12-06 ENCOUNTER — APPOINTMENT (OUTPATIENT)
Dept: PAIN MANAGEMENT | Facility: CLINIC | Age: 49
End: 2024-12-06
Payer: MEDICAID

## 2024-12-06 VITALS — HEIGHT: 63 IN | WEIGHT: 159 LBS | BODY MASS INDEX: 28.17 KG/M2

## 2024-12-06 DIAGNOSIS — M54.17 RADICULOPATHY, LUMBOSACRAL REGION: ICD-10-CM

## 2024-12-06 DIAGNOSIS — M54.50 LOW BACK PAIN, UNSPECIFIED: ICD-10-CM

## 2024-12-06 PROCEDURE — 99214 OFFICE O/P EST MOD 30 MIN: CPT

## 2024-12-06 RX ORDER — NAPROXEN 500 MG/1
500 TABLET ORAL
Qty: 30 | Refills: 0 | Status: ACTIVE | COMMUNITY
Start: 2024-12-06 | End: 1900-01-01

## 2025-04-15 ENCOUNTER — NON-APPOINTMENT (OUTPATIENT)
Age: 50
End: 2025-04-15

## 2025-05-05 ENCOUNTER — APPOINTMENT (OUTPATIENT)
Dept: GASTROENTEROLOGY | Facility: CLINIC | Age: 50
End: 2025-05-05

## 2025-07-25 NOTE — ASU PREOP CHECKLIST - SURGICAL CONSENT
Anesthesia Post-op Note    Patient: Blaire Del Rosario  Procedure(s) Performed: COLONOSCOPY-RECALL with polypectomy  Anesthesia type: MAC    Vitals Value Taken Time   Temp 97.7 07/25/25 0844   Pulse 61 07/25/25 0844   Resp 14 07/25/25 0844   SpO2 99 07/25/25 0844   /70 07/25/25 0844         Patient Location: [facility]  Post-op Vital Signs:stable  Level of Consciousness: participates in exam, awake, oriented, alert and answers questions appropriately  Respiratory Status: spontaneous ventilation and room air  Cardiovascular blood pressure returned to baseline  Hydration: euvolemic  Pain Management: adequately controlled  Handoff: Handoff to receiving clinician was performed and questions were answered  Vomiting: none  Nausea: None  Airway Patency:patent  Post-op Assessment: awake, alert, appropriately conversant, or baseline, no complications, patient tolerated procedure well and no evidence of recall  Comments: Mental status recovered: patient is awake and oriented, participates in evaluation, answer questions appropriately  VS noted, are stable, and are documented on anesthesia record  Respiratory function satisfactory; airway patent   Adequate pain control   Nausea and vomiting control satisfactory  No apparent anesthetic complications   Meets PACU discharge criteria        No notable events documented.                       done

## (undated) DEVICE — NDL COUNTER FOAM AND MAGNET 15-30

## (undated) DEVICE — SUCTION YANKAUER NO CONTROL VENT

## (undated) DEVICE — FOLEY HOLDER STATLOCK 2 WAY ADULT

## (undated) DEVICE — TUBING SUCTION CONN 6FT STERILE

## (undated) DEVICE — SYR ALLIANCE II INFLATION 60ML

## (undated) DEVICE — DRAPE LIGHT HANDLE COVER (GREEN)

## (undated) DEVICE — CATH IV SAFE BC 20G X 1.16" (PINK)

## (undated) DEVICE — BIOPSY FORCEP RADIAL JAW 4 STANDARD WITH NEEDLE

## (undated) DEVICE — TUBING IV SET GRAVITY 3Y 100" MACRO

## (undated) DEVICE — SOL IRR POUR NS 0.9% 500ML

## (undated) DEVICE — APPLICATOR Q TIP 6" WOOD STEM

## (undated) DEVICE — TUBING SUCTION 20FT

## (undated) DEVICE — SENSOR O2 FINGER ADULT 24/CA

## (undated) DEVICE — GLV 6.5 PROTEXIS (WHITE)

## (undated) DEVICE — DRAPE INSTRUMENT POUCH 6.75" X 11"

## (undated) DEVICE — PACK IV START WITH CHG

## (undated) DEVICE — ELCTR BOVIE TIP CLEANER SCRATCH PAD

## (undated) DEVICE — BITE BLOCK ADULT 20 X 27MM (GREEN)

## (undated) DEVICE — BLADE SCALPEL SAFETY #15 WITH PLASTIC GREEN HANDLE

## (undated) DEVICE — CATH IV SAFE BC 22G X 1" (BLUE)

## (undated) DEVICE — ELCTR BOVIE PENCIL HANDPIECE

## (undated) DEVICE — BALLOON US ENDO

## (undated) DEVICE — SPECIMEN CONTAINER 100ML

## (undated) DEVICE — SOL INJ NS 0.9% 500ML 2 PORT

## (undated) DEVICE — PACK LITHOTOMY